# Patient Record
Sex: FEMALE | Race: OTHER | HISPANIC OR LATINO | Employment: UNEMPLOYED | URBAN - METROPOLITAN AREA
[De-identification: names, ages, dates, MRNs, and addresses within clinical notes are randomized per-mention and may not be internally consistent; named-entity substitution may affect disease eponyms.]

---

## 2017-02-21 ENCOUNTER — HISTORICAL (OUTPATIENT)
Dept: RADIOLOGY | Facility: HOSPITAL | Age: 42
End: 2017-02-21

## 2018-09-13 ENCOUNTER — HISTORICAL (OUTPATIENT)
Dept: RADIOLOGY | Facility: HOSPITAL | Age: 43
End: 2018-09-13

## 2018-11-13 ENCOUNTER — HISTORICAL (OUTPATIENT)
Dept: ADMINISTRATIVE | Facility: HOSPITAL | Age: 43
End: 2018-11-13

## 2018-11-13 LAB
ABS NEUT (OLG): 4.1 X10(3)/MCL (ref 2.1–9.2)
ALBUMIN SERPL-MCNC: 4.1 GM/DL (ref 3.4–5)
ALBUMIN/GLOB SERPL: 1.46 {RATIO} (ref 1.5–2.5)
ALP SERPL-CCNC: 94 UNIT/L (ref 38–126)
ALT SERPL-CCNC: 20 UNIT/L (ref 7–52)
APPEARANCE, UA: CLEAR
AST SERPL-CCNC: 16 UNIT/L (ref 15–37)
BACTERIA #/AREA URNS AUTO: ABNORMAL /HPF
BILIRUB SERPL-MCNC: 1.2 MG/DL (ref 0.2–1)
BILIRUB UR QL STRIP: NEGATIVE MG/DL
BILIRUBIN DIRECT+TOT PNL SERPL-MCNC: 0.2 MG/DL (ref 0–0.5)
BILIRUBIN DIRECT+TOT PNL SERPL-MCNC: 1 MG/DL
BUN SERPL-MCNC: 9 MG/DL (ref 7–18)
CALCIUM SERPL-MCNC: 8.9 MG/DL (ref 8.5–10)
CHLORIDE SERPL-SCNC: 109 MMOL/L (ref 98–107)
CHOLEST SERPL-MCNC: 154 MG/DL (ref 0–200)
CHOLEST/HDLC SERPL: 3.2 {RATIO}
CO2 SERPL-SCNC: 24 MMOL/L (ref 21–32)
COLOR UR: YELLOW
CREAT SERPL-MCNC: 0.74 MG/DL (ref 0.6–1.3)
ERYTHROCYTE [DISTWIDTH] IN BLOOD BY AUTOMATED COUNT: 12.6 % (ref 11.5–17)
EST. AVERAGE GLUCOSE BLD GHB EST-MCNC: 103 MG/DL
GLOBULIN SER-MCNC: 2.8 GM/DL (ref 1.2–3)
GLUCOSE (UA): NEGATIVE MG/DL
GLUCOSE SERPL-MCNC: 113 MG/DL (ref 74–106)
HBA1C MFR BLD: 5.2 % (ref 4.4–6.4)
HCT VFR BLD AUTO: 45.3 % (ref 37–47)
HDLC SERPL-MCNC: 48 MG/DL (ref 35–60)
HGB BLD-MCNC: 14.3 GM/DL (ref 12–16)
HGB UR QL STRIP: ABNORMAL UNIT/L
KETONES UR QL STRIP: NEGATIVE MG/DL
LDLC SERPL CALC-MCNC: 94 MG/DL (ref 0–129)
LEUKOCYTE ESTERASE UR QL STRIP: ABNORMAL UNIT/L
LYMPHOCYTES # BLD AUTO: 1.6 X10(3)/MCL (ref 0.6–3.4)
LYMPHOCYTES NFR BLD AUTO: 25.1 % (ref 13–40)
MCH RBC QN AUTO: 29.5 PG (ref 27–31.2)
MCHC RBC AUTO-ENTMCNC: 32 GM/DL (ref 32–36)
MCV RBC AUTO: 94 FL (ref 80–94)
MONOCYTES # BLD AUTO: 0.5 X10(3)/MCL (ref 0.1–1.3)
MONOCYTES NFR BLD AUTO: 8.4 % (ref 0.1–24)
NEUTROPHILS NFR BLD AUTO: 66.5 % (ref 47–80)
NITRITE UR QL STRIP.AUTO: NEGATIVE
PH UR STRIP: 5 [PH]
PLATELET # BLD AUTO: 255 X10(3)/MCL (ref 130–400)
PMV BLD AUTO: 9.9 FL (ref 9.4–12.4)
POTASSIUM SERPL-SCNC: 4 MMOL/L (ref 3.5–5.1)
PROT SERPL-MCNC: 6.9 GM/DL (ref 6.4–8.2)
PROT UR QL STRIP: NEGATIVE MG/DL
RBC # BLD AUTO: 4.84 X10(6)/MCL (ref 4.2–5.4)
RBC #/AREA URNS HPF: ABNORMAL /HPF
SODIUM SERPL-SCNC: 137 MMOL/L (ref 136–145)
SP GR UR STRIP: 1.01
SQUAMOUS EPITHELIAL, UA: ABNORMAL /LPF
TRIGL SERPL-MCNC: 88 MG/DL (ref 30–150)
TSH SERPL-ACNC: 3.74 MIU/ML (ref 0.35–4.94)
UROBILINOGEN UR STRIP-ACNC: 0.2 MG/DL
VLDLC SERPL CALC-MCNC: 17.6 MG/DL
WBC # SPEC AUTO: 6.2 X10(3)/MCL (ref 4.5–11.5)
WBC #/AREA URNS AUTO: ABNORMAL /[HPF]

## 2018-11-29 ENCOUNTER — HISTORICAL (OUTPATIENT)
Dept: ADMINISTRATIVE | Facility: HOSPITAL | Age: 43
End: 2018-11-29

## 2018-11-29 LAB
APPEARANCE, UA: CLEAR
BACTERIA #/AREA URNS AUTO: NORMAL /HPF
BILIRUB UR QL STRIP: NEGATIVE MG/DL
COLOR UR: NORMAL
GLUCOSE (UA): NEGATIVE MG/DL
HGB UR QL STRIP: NEGATIVE UNIT/L
KETONES UR QL STRIP: NEGATIVE MG/DL
LEUKOCYTE ESTERASE UR QL STRIP: NEGATIVE UNIT/L
NITRITE UR QL STRIP.AUTO: NEGATIVE
PH UR STRIP: 6 [PH]
PROT UR QL STRIP: NEGATIVE MG/DL
RBC #/AREA URNS HPF: NORMAL /HPF
SP GR UR STRIP: <1.005
SQUAMOUS EPITHELIAL, UA: NORMAL /LPF
UROBILINOGEN UR STRIP-ACNC: 0.2 MG/DL
WBC #/AREA URNS AUTO: NORMAL /[HPF]

## 2019-11-18 ENCOUNTER — HISTORICAL (OUTPATIENT)
Dept: ADMINISTRATIVE | Facility: HOSPITAL | Age: 44
End: 2019-11-18

## 2019-11-18 LAB
ABS NEUT (OLG): 4.6 X10(3)/MCL (ref 2.1–9.2)
ALBUMIN SERPL-MCNC: 4.2 GM/DL (ref 3.4–5)
ALBUMIN/GLOB SERPL: 1.35 {RATIO} (ref 1.5–2.5)
ALP SERPL-CCNC: 113 UNIT/L (ref 38–126)
ALT SERPL-CCNC: 22 UNIT/L (ref 7–52)
APPEARANCE, UA: CLEAR
AST SERPL-CCNC: 15 UNIT/L (ref 15–37)
BACTERIA #/AREA URNS AUTO: NORMAL /HPF
BILIRUB SERPL-MCNC: 1.1 MG/DL (ref 0.2–1)
BILIRUB UR QL STRIP: NEGATIVE MG/DL
BILIRUBIN DIRECT+TOT PNL SERPL-MCNC: 0.2 MG/DL (ref 0–0.5)
BILIRUBIN DIRECT+TOT PNL SERPL-MCNC: 0.9 MG/DL
BUN SERPL-MCNC: 11 MG/DL (ref 7–18)
CALCIUM SERPL-MCNC: 9.2 MG/DL (ref 8.5–10)
CHLORIDE SERPL-SCNC: 108 MMOL/L (ref 98–107)
CHOLEST SERPL-MCNC: 155 MG/DL (ref 0–200)
CHOLEST/HDLC SERPL: 3.4 {RATIO}
CO2 SERPL-SCNC: 25 MMOL/L (ref 21–32)
COLOR UR: YELLOW
CREAT SERPL-MCNC: 0.82 MG/DL (ref 0.6–1.3)
ERYTHROCYTE [DISTWIDTH] IN BLOOD BY AUTOMATED COUNT: 12.5 % (ref 11.5–17)
EST. AVERAGE GLUCOSE BLD GHB EST-MCNC: 100 MG/DL
GLOBULIN SER-MCNC: 3.1 GM/DL (ref 1.2–3)
GLUCOSE (UA): NEGATIVE MG/DL
GLUCOSE SERPL-MCNC: 115 MG/DL (ref 74–106)
HBA1C MFR BLD: 5.1 % (ref 4.4–6.4)
HCT VFR BLD AUTO: 42.3 % (ref 37–47)
HDLC SERPL-MCNC: 45 MG/DL (ref 35–60)
HGB BLD-MCNC: 15.5 GM/DL (ref 12–16)
HGB UR QL STRIP: NEGATIVE UNIT/L
KETONES UR QL STRIP: NEGATIVE MG/DL
LDLC SERPL CALC-MCNC: 100 MG/DL (ref 0–129)
LEUKOCYTE ESTERASE UR QL STRIP: NORMAL UNIT/L
LYMPHOCYTES # BLD AUTO: 2.1 X10(3)/MCL (ref 0.6–3.4)
LYMPHOCYTES NFR BLD AUTO: 29.7 % (ref 13–40)
MCH RBC QN AUTO: 31.8 PG (ref 27–31.2)
MCHC RBC AUTO-ENTMCNC: 37 GM/DL (ref 32–36)
MCV RBC AUTO: 87 FL (ref 80–94)
MONOCYTES # BLD AUTO: 0.5 X10(3)/MCL (ref 0.1–1.3)
MONOCYTES NFR BLD AUTO: 6.7 % (ref 0.1–24)
NEUTROPHILS NFR BLD AUTO: 63.6 % (ref 47–80)
NITRITE UR QL STRIP.AUTO: NEGATIVE
PH UR STRIP: 5.5 [PH]
PLATELET # BLD AUTO: 295 X10(3)/MCL (ref 130–400)
PMV BLD AUTO: 9.8 FL (ref 9.4–12.4)
POTASSIUM SERPL-SCNC: 4.2 MMOL/L (ref 3.5–5.1)
PROT SERPL-MCNC: 7.3 GM/DL (ref 6.4–8.2)
PROT UR QL STRIP: NEGATIVE MG/DL
RBC # BLD AUTO: 4.87 X10(6)/MCL (ref 4.2–5.4)
RBC #/AREA URNS HPF: NORMAL /HPF
SODIUM SERPL-SCNC: 142 MMOL/L (ref 136–145)
SP GR UR STRIP: 1.01
SQUAMOUS EPITHELIAL, UA: NORMAL /LPF
TRIGL SERPL-MCNC: 93 MG/DL (ref 30–150)
TSH SERPL-ACNC: 2.79 MIU/ML (ref 0.35–4.94)
UROBILINOGEN UR STRIP-ACNC: 0.2 MG/DL
VLDLC SERPL CALC-MCNC: 18.6 MG/DL
WBC # SPEC AUTO: 7.2 X10(3)/MCL (ref 4.5–11.5)
WBC #/AREA URNS AUTO: NORMAL /[HPF]

## 2020-11-20 ENCOUNTER — HISTORICAL (OUTPATIENT)
Dept: ADMINISTRATIVE | Facility: HOSPITAL | Age: 45
End: 2020-11-20

## 2020-11-20 LAB
ABS NEUT (OLG): 3.7 X10(3)/MCL (ref 2.1–9.2)
ALBUMIN SERPL-MCNC: 4 GM/DL (ref 3.4–5)
ALBUMIN/GLOB SERPL: 1.38 {RATIO} (ref 1.5–2.5)
ALP SERPL-CCNC: 109 UNIT/L (ref 38–126)
ALT SERPL-CCNC: 18 UNIT/L (ref 7–52)
APPEARANCE, UA: ABNORMAL
AST SERPL-CCNC: 14 UNIT/L (ref 15–37)
BACTERIA #/AREA URNS AUTO: ABNORMAL /HPF
BILIRUB SERPL-MCNC: 1.2 MG/DL (ref 0.2–1)
BILIRUB UR QL STRIP: NEGATIVE MG/DL
BILIRUBIN DIRECT+TOT PNL SERPL-MCNC: 0.2 MG/DL (ref 0–0.5)
BILIRUBIN DIRECT+TOT PNL SERPL-MCNC: 1 MG/DL
BUN SERPL-MCNC: 12 MG/DL (ref 7–18)
CALCIUM SERPL-MCNC: 9.2 MG/DL (ref 8.5–10)
CHLORIDE SERPL-SCNC: 108 MMOL/L (ref 98–107)
CHOLEST SERPL-MCNC: 162 MG/DL (ref 0–200)
CHOLEST/HDLC SERPL: 3.6 {RATIO}
CO2 SERPL-SCNC: 26 MMOL/L (ref 21–32)
COLOR UR: YELLOW
CREAT SERPL-MCNC: 0.73 MG/DL (ref 0.6–1.3)
ERYTHROCYTE [DISTWIDTH] IN BLOOD BY AUTOMATED COUNT: 12.7 % (ref 11.5–17)
EST. AVERAGE GLUCOSE BLD GHB EST-MCNC: 105 MG/DL
GLOBULIN SER-MCNC: 2.9 GM/DL (ref 1.2–3)
GLUCOSE (UA): NEGATIVE MG/DL
GLUCOSE SERPL-MCNC: 103 MG/DL (ref 74–106)
HBA1C MFR BLD: 5.3 % (ref 4.4–6.4)
HCT VFR BLD AUTO: 42.2 % (ref 37–47)
HDLC SERPL-MCNC: 45 MG/DL (ref 35–60)
HGB BLD-MCNC: 13.8 GM/DL (ref 12–16)
HGB UR QL STRIP: ABNORMAL UNIT/L
KETONES UR QL STRIP: NEGATIVE MG/DL
LDLC SERPL CALC-MCNC: 113 MG/DL (ref 0–129)
LEUKOCYTE ESTERASE UR QL STRIP: NEGATIVE UNIT/L
LYMPHOCYTES # BLD AUTO: 1.7 X10(3)/MCL (ref 0.6–3.4)
LYMPHOCYTES NFR BLD AUTO: 28.2 % (ref 13–40)
MCH RBC QN AUTO: 28.8 PG (ref 27–31.2)
MCHC RBC AUTO-ENTMCNC: 33 GM/DL (ref 32–36)
MCV RBC AUTO: 88 FL (ref 80–94)
MONOCYTES # BLD AUTO: 0.6 X10(3)/MCL (ref 0.1–1.3)
MONOCYTES NFR BLD AUTO: 10.1 % (ref 0.1–24)
NEUTROPHILS NFR BLD AUTO: 61.7 % (ref 47–80)
NITRITE UR QL STRIP.AUTO: NEGATIVE
PH UR STRIP: 5.5 [PH]
PLATELET # BLD AUTO: 279 X10(3)/MCL (ref 130–400)
PMV BLD AUTO: 9.2 FL (ref 9.4–12.4)
POTASSIUM SERPL-SCNC: 4.2 MMOL/L (ref 3.5–5.1)
PROT SERPL-MCNC: 6.9 GM/DL (ref 6.4–8.2)
PROT UR QL STRIP: NEGATIVE MG/DL
RBC # BLD AUTO: 4.79 X10(6)/MCL (ref 4.2–5.4)
RBC #/AREA URNS HPF: ABNORMAL /HPF
SODIUM SERPL-SCNC: 139 MMOL/L (ref 136–145)
SP GR UR STRIP: 1.01
SQUAMOUS EPITHELIAL, UA: ABNORMAL /LPF
TRIGL SERPL-MCNC: 102 MG/DL (ref 30–150)
TSH SERPL-ACNC: 3.19 MIU/ML (ref 0.35–4.94)
UROBILINOGEN UR STRIP-ACNC: 0.2 MG/DL
VLDLC SERPL CALC-MCNC: 20.4 MG/DL
WBC # SPEC AUTO: 6 X10(3)/MCL (ref 4.5–11.5)
WBC #/AREA URNS AUTO: ABNORMAL /[HPF]

## 2020-12-18 ENCOUNTER — HISTORICAL (OUTPATIENT)
Dept: RADIOLOGY | Facility: HOSPITAL | Age: 45
End: 2020-12-18

## 2021-01-11 ENCOUNTER — HISTORICAL (OUTPATIENT)
Dept: RADIOLOGY | Facility: HOSPITAL | Age: 46
End: 2021-01-11

## 2021-11-23 ENCOUNTER — HISTORICAL (OUTPATIENT)
Dept: ADMINISTRATIVE | Facility: HOSPITAL | Age: 46
End: 2021-11-23

## 2021-11-23 LAB
ABS NEUT (OLG): 2.8 X10(3)/MCL (ref 2.1–9.2)
ALBUMIN SERPL-MCNC: 4.1 GM/DL (ref 3.4–5)
ALBUMIN/GLOB SERPL: 1.58 {RATIO} (ref 1.5–2.5)
ALP SERPL-CCNC: 111 UNIT/L (ref 38–126)
ALT SERPL-CCNC: 16 UNIT/L (ref 7–52)
APPEARANCE, UA: CLEAR
AST SERPL-CCNC: 14 UNIT/L (ref 15–37)
BACTERIA #/AREA URNS AUTO: ABNORMAL /HPF
BILIRUB SERPL-MCNC: 1 MG/DL (ref 0.2–1)
BILIRUB UR QL STRIP: NEGATIVE MG/DL
BILIRUBIN DIRECT+TOT PNL SERPL-MCNC: 0.2 MG/DL (ref 0–0.5)
BILIRUBIN DIRECT+TOT PNL SERPL-MCNC: 0.8 MG/DL
BUN SERPL-MCNC: 9 MG/DL (ref 7–18)
CALCIUM SERPL-MCNC: 9.1 MG/DL (ref 8.5–10)
CHLORIDE SERPL-SCNC: 107 MMOL/L (ref 98–107)
CHOLEST SERPL-MCNC: 160 MG/DL (ref 0–200)
CHOLEST/HDLC SERPL: 3.6 {RATIO}
CO2 SERPL-SCNC: 27 MMOL/L (ref 21–32)
COLOR UR: ABNORMAL
CREAT SERPL-MCNC: 0.7 MG/DL (ref 0.6–1.3)
ERYTHROCYTE [DISTWIDTH] IN BLOOD BY AUTOMATED COUNT: 13.1 % (ref 11.5–17)
EST. AVERAGE GLUCOSE BLD GHB EST-MCNC: 103 MG/DL
GLOBULIN SER-MCNC: 2.6 GM/DL (ref 1.2–3)
GLUCOSE (UA): NEGATIVE MG/DL
GLUCOSE SERPL-MCNC: 104 MG/DL (ref 74–106)
HBA1C MFR BLD: 5.2 % (ref 4.4–6.4)
HCT VFR BLD AUTO: 42 % (ref 37–47)
HDLC SERPL-MCNC: 45 MG/DL (ref 35–60)
HGB BLD-MCNC: 13.3 GM/DL (ref 12–16)
HGB UR QL STRIP: ABNORMAL UNIT/L
KETONES UR QL STRIP: NEGATIVE MG/DL
LDLC SERPL CALC-MCNC: 96 MG/DL (ref 0–129)
LEUKOCYTE ESTERASE UR QL STRIP: NEGATIVE UNIT/L
LYMPHOCYTES # BLD AUTO: 1.1 X10(3)/MCL (ref 0.6–3.4)
LYMPHOCYTES NFR BLD AUTO: 24.4 % (ref 13–40)
MCH RBC QN AUTO: 28.1 PG (ref 27–31.2)
MCHC RBC AUTO-ENTMCNC: 32 GM/DL (ref 32–36)
MCV RBC AUTO: 89 FL (ref 80–94)
MONOCYTES # BLD AUTO: 0.5 X10(3)/MCL (ref 0.1–1.3)
MONOCYTES NFR BLD AUTO: 11.5 % (ref 0.1–24)
NEUTROPHILS NFR BLD AUTO: 64.1 % (ref 47–80)
NITRITE UR QL STRIP.AUTO: NEGATIVE
PH UR STRIP: 6.5 [PH]
PLATELET # BLD AUTO: 294 X10(3)/MCL (ref 130–400)
PMV BLD AUTO: 9.4 FL (ref 9.4–12.4)
POTASSIUM SERPL-SCNC: 4 MMOL/L (ref 3.5–5.1)
PROT SERPL-MCNC: 6.7 GM/DL (ref 6.4–8.2)
PROT UR QL STRIP: ABNORMAL MG/DL
RBC # BLD AUTO: 4.73 X10(6)/MCL (ref 4.2–5.4)
RBC #/AREA URNS HPF: ABNORMAL /HPF
SODIUM SERPL-SCNC: 142 MMOL/L (ref 136–145)
SP GR UR STRIP: 1.01
SQUAMOUS EPITHELIAL, UA: ABNORMAL /LPF
TRIGL SERPL-MCNC: 107 MG/DL (ref 30–150)
TSH SERPL-ACNC: 2.18 MIU/ML (ref 0.35–4.94)
UROBILINOGEN UR STRIP-ACNC: 0.2 MG/DL
VLDLC SERPL CALC-MCNC: 21.4 MG/DL
WBC # SPEC AUTO: 4.4 X10(3)/MCL (ref 4.5–11.5)
WBC #/AREA URNS AUTO: ABNORMAL /[HPF]

## 2021-12-14 ENCOUNTER — HISTORICAL (OUTPATIENT)
Dept: ENDOSCOPY | Facility: HOSPITAL | Age: 46
End: 2021-12-14

## 2022-04-10 ENCOUNTER — HISTORICAL (OUTPATIENT)
Dept: ADMINISTRATIVE | Facility: HOSPITAL | Age: 47
End: 2022-04-10

## 2022-04-24 VITALS
WEIGHT: 288.56 LBS | SYSTOLIC BLOOD PRESSURE: 128 MMHG | OXYGEN SATURATION: 99 % | BODY MASS INDEX: 56.65 KG/M2 | HEIGHT: 60 IN | DIASTOLIC BLOOD PRESSURE: 74 MMHG

## 2022-11-28 PROBLEM — I10 HTN (HYPERTENSION): Status: ACTIVE | Noted: 2022-11-28

## 2022-11-28 PROBLEM — R51.9 HEAD ACHE: Status: ACTIVE | Noted: 2022-11-28

## 2022-11-28 PROBLEM — Z00.00 WELLNESS EXAMINATION: Status: ACTIVE | Noted: 2022-11-28

## 2022-11-29 PROBLEM — R73.9 HYPERGLYCEMIA: Status: ACTIVE | Noted: 2022-11-29

## 2023-02-23 ENCOUNTER — CLINICAL SUPPORT (OUTPATIENT)
Dept: BARIATRICS | Facility: HOSPITAL | Age: 48
End: 2023-02-23

## 2023-02-23 VITALS — HEIGHT: 61 IN | WEIGHT: 293 LBS | BODY MASS INDEX: 55.32 KG/M2

## 2023-02-23 NOTE — PROGRESS NOTES
BEHAVIOR MODIFICATION AND EXERCISE CONSULT    Non Surgical: [] Surgical: [x]    PERSONAL:     What initiated your interest in bariatric surgery?   Tried dieting and weight just keeps going up. Started gaining in early 20's.    Marital Status: []Single   [x]   []   []      Do you have children? 4 (31, 29, 21, 15)    What is your highest level of education completed? Some college    Who is your social or relational support?     Do you work? []Yes   [x]No   []Disabled   []Retired    If yes, what is your occupation?      Do you enjoy your work?     PHYSICAL ACTIVITY:    Do you currently exercise: []Yes   [x]No    If so, please describe:      Have you experienced any injuries and/or restrictions that may limit your physical activity? [x]Yes   []No      If so, please describe: steel dmitry in left leg 1994, right knee.    How do you feel about exercise? Enjoy walking      BEHAVIORS:    What behavior(s) would you like to change in order to be healthy? Healthier eating, exercise.    On a scale of 1-10 (1-extremely low, 10-extremely high), how motivated are you to change your behavior(s)? 10    Do you currently use any type of tobacco products (vape, dip, cigarettes, etc.) No    If yes, on average, how many and/or how often do you use these products on a daily basis?    How many hours of sleep do you average? 5 (has had trouble the last 5 years)    Rate your stress level on a scale of 1-10 (1-extremely low, 10-extremely high) 5    What is your biggest stressor? Can't think of anything.    Is your appetite affected by stress? Yes, boredom, sad, happy    How do you cope and/or manage stress? Not really    NOTES: Highest weight is 315 lbs. Lowest adult weight 195 lbs. Goal weight 150 lbs. A body composition was conducted and patient was educated on results. Waist/hip ratio is 56/61 inches. Handout: emotional connections to food.    Sharon Christianson, GERMAINE, CPT, CHC

## 2023-02-23 NOTE — PROGRESS NOTES
NUTRITIONAL CONSULT    Initial assessment for laparoscopic Cheng-en-Y   Non Surgical: []      PAST HISTORY:   Dieting attempts include Pt started gaining weight after children, she will do diets and she doesn't see change so she stops   Highest weight: 316#    CLINICAL DATA:  Height: 61in  Weight: 316 lbs  IBW: 110 lbs  BMI: 57.80  Bariatric goal weight (125% EBW): 132 lbs  Patient's goal weight: 160 lbs    FAMILY HISTORY OF OBESITY:   [x]Yes    []No            WHAT SIDE OF THE FAMILY?   []Mother   []Father   [x]Sibling   []Child     []Extended    []Adopted       Goal for Bariatric Surgery: to improve health, to improve quality of life, to lose weight, and to prevent future medical conditions    NUTRITION & HEALTH HISTORY:  Greatest challenge: sweets, starchy CHO, and portion control    Current diet recall:   Coffee with breakfast bread   330pm: Pork chops rice and vegetables   Snacking: sweets   Current Dietary Patterns:  Meal pattern: 2  Snackin / day Type:   Vegetables: Likes a variety. Eats almost daily.  Fruits: Likes a variety. Eats almost daily.  Beverages: water and coffee without sugar  Alcohol consumption: Never. Type:   Dining out: Monthly. Mostly fast food and take-out.  Grocery shopping and food prep: [x]Self   []Spouse   []Other:   Emotional eating: []Yes   [x]No Which emotions if yes:  boredom   Nighttime snacking: [x]Yes   []No Middle of night: []Yes   []No  Sweets Before bedtime: []Yes   []No  Hx of disordered eating behaviors: []Yes   [x]No Anorexia: []Yes   []No Bulimia: []Yes   []No  Purging: []Yes   []No Binging: []Yes   []No  History of vitamin/mineral deficiencies:   []Yes   [x]No    Vitamins / Minerals / Herbs:   None     Food Allergies:   None       ASSESSMENT:  Patient reports attempts at weight loss, only to regain lost weight.  Patient demonstrated knowledge of healthy eating behaviors and exercise patterns; admits to not eating healthy and not exercising at this point.  Patient states  willingness to change lifestyle and make behavior modifications.  Expect good  compliance after surgery at this time.        ESTIMATED NEEDS:  Calories: 0070-0539  (20-25 kcal/kg adjusted BW/d)  Protein:  60-66  (1.0-1.1 g/kg adjusted BW/d)  Fluid:   1200  (20 mL/kg actual BW/d)    BARIATRIC DIET DISCUSSION:  Discussed diet after surgery and related to patients food record.  Reviewed diet progression before and after surgery.  Reinforced that surgery is not a magic bullet and importance of low fat foods and no snacking.  Answered all questions.    RECOMMENDATIONS:  Patient is a good candidate for bariatric surgery.      PLAN:  Eat 3 meals per day with protein   Limit starches, start to measure out

## 2023-03-06 PROBLEM — Z00.00 WELLNESS EXAMINATION: Status: RESOLVED | Noted: 2022-11-28 | Resolved: 2023-03-06

## 2023-03-08 ENCOUNTER — CLINICAL SUPPORT (OUTPATIENT)
Dept: BARIATRICS | Facility: HOSPITAL | Age: 48
End: 2023-03-08

## 2023-03-08 VITALS — BODY MASS INDEX: 55.32 KG/M2 | WEIGHT: 293 LBS | HEIGHT: 61 IN

## 2023-03-08 PROCEDURE — 94200034 HC BARIATRIC NUTRITIONAL SVCS PT GRADE I: Performed by: DIETITIAN, REGISTERED

## 2023-03-08 NOTE — PROGRESS NOTES
Patient Education [] MSWL Visit Number:      [x]  Pre-op Wt Loss Goal (as ordered on referral): 50kg/m2  [] NSWL Visit:     Current Wt: 316#  Current BMI: 59.7kg/m2                                                                      Barriers to learning:  [x]None evident  []Acuity of illness  []Cognitive defects  []Cultural barriers  []Desire/Motivation  []Difficulty concentrating  []Emotional state  []Financial concerns  []Hearing deficit  []Language barrier  []Literacy  []Memory problems  []Vision impairment     Home caregiver present for session   []YES  [x] NO       Teaching methods:  []Demonstration  [x]Explanation  [x]Printed materials  []Teach back  []Virtual/web based         Verbalizes understanding Demonstrates  Needs further teaching Needs practice/ supervision Comments    Bariatric Surgery Diet  [] [] [] []    Clear liquid [] [] [] []    Full liquid [] [] [] []    Weight Reduction [x] [] [] [] BMI 50   Other Diet [] [] [] []          Additional Learner (s) Present                                                                  []Spouse   []Daughter    []  Son  []Family member   []Friend   []Grandfather   []Grandmother   []Father   []Mother   []Other       Expected Compliance:  [x]Good  []Fair  []Poor    Additional Information:    Pt educated on modified meal plan. Advised to reduce starchy CHOs to 1/2 c or quarter of the plate per meal. Advised pt on healthier snack options in order to reduce sugar intake. Pt given a dining out guide, sample menu and grocery guide.      24 hr recall:  B 6:30PM- bagel with cream cheese, egg; coffee  Supp 3/4pm- rice, gravy, pork chops/BBQ chicken; tacos, enchiladas  Sn- ice cream, candy, cake  Bevs- water, coke    Plan:  Return to clinic 4w for wt loss assessment.

## 2023-04-05 ENCOUNTER — CLINICAL SUPPORT (OUTPATIENT)
Dept: BARIATRICS | Facility: HOSPITAL | Age: 48
End: 2023-04-05

## 2023-04-05 VITALS — HEIGHT: 61 IN | WEIGHT: 293 LBS | BODY MASS INDEX: 55.32 KG/M2

## 2023-04-05 PROCEDURE — 94200034 HC BARIATRIC NUTRITIONAL SVCS PT GRADE I: Performed by: DIETITIAN, REGISTERED

## 2023-04-05 NOTE — PROGRESS NOTES
Patient Education [] MSWL Visit Number:      [x]  Pre-op Wt Loss Goal (as ordered on referral): BMI 55 kg/m2  [] NS Visit:     Current Wt: 304#  Current BMI:57.4 kg/m2                                                                      Barriers to learning:  [x]None evident  []Acuity of illness  []Cognitive defects  []Cultural barriers  []Desire/Motivation  []Difficulty concentrating  []Emotional state  []Financial concerns  []Hearing deficit  []Language barrier  []Literacy  []Memory problems  []Vision impairment     Home caregiver present for session   []YES  [x] NO       Teaching methods:  []Demonstration  [x]Explanation  []Printed materials  []Teach back  []Virtual/web based         Verbalizes understanding Demonstrates  Needs further teaching Needs practice/ supervision Comments    Bariatric Surgery Diet  [] [] [] []    Clear liquid [] [] [] []    Full liquid [] [] [] []    Weight Reduction [x] [] [] []    Other Diet [] [] [] []          Additional Learner (s) Present                                                                  []Spouse   []Daughter    []  Son  []Family member   []Friend   []Grandfather   []Grandmother   []Father   []Mother   []Other       Expected Compliance:  [x]Good  []Fair  []Poor    Additional Information:    Pt with 12# wt loss. Pt has reduced sugar and starchy CHO intake and increased protein as a result. Pt with walking 10 mins/d, 5d/wk. Pt starting Renetta next week as well.      24 hr recall:  B- coffee with SF creamer, 1 slice toast, Bulgarian pierre/sausage chelsea  Sn- Orgain protein shake, made with unsweetened almond milk  L- salad with 4oz chicken  Sn- another protein   Supp- dinner with family  Bevs- water    Plan:  Continue with current meal and exercise plan.

## 2023-05-16 ENCOUNTER — OFFICE VISIT (OUTPATIENT)
Dept: GYNECOLOGY | Facility: CLINIC | Age: 48
End: 2023-05-16
Payer: COMMERCIAL

## 2023-05-16 ENCOUNTER — CLINICAL SUPPORT (OUTPATIENT)
Dept: BARIATRICS | Facility: HOSPITAL | Age: 48
End: 2023-05-16

## 2023-05-16 VITALS
DIASTOLIC BLOOD PRESSURE: 83 MMHG | BODY MASS INDEX: 55.32 KG/M2 | RESPIRATION RATE: 20 BRPM | HEIGHT: 61 IN | TEMPERATURE: 98 F | OXYGEN SATURATION: 100 % | SYSTOLIC BLOOD PRESSURE: 137 MMHG | HEART RATE: 73 BPM | WEIGHT: 293 LBS

## 2023-05-16 VITALS — WEIGHT: 293 LBS | BODY MASS INDEX: 55.32 KG/M2 | HEIGHT: 61 IN

## 2023-05-16 DIAGNOSIS — Z12.31 VISIT FOR SCREENING MAMMOGRAM: ICD-10-CM

## 2023-05-16 DIAGNOSIS — Z12.4 PAP SMEAR FOR CERVICAL CANCER SCREENING: Primary | ICD-10-CM

## 2023-05-16 DIAGNOSIS — E66.9 OBESITY, UNSPECIFIED CLASSIFICATION, UNSPECIFIED OBESITY TYPE, UNSPECIFIED WHETHER SERIOUS COMORBIDITY PRESENT: ICD-10-CM

## 2023-05-16 DIAGNOSIS — E66.9 OBESITY, UNSPECIFIED CLASSIFICATION, UNSPECIFIED OBESITY TYPE, UNSPECIFIED WHETHER SERIOUS COMORBIDITY PRESENT: Primary | ICD-10-CM

## 2023-05-16 PROCEDURE — 4010F ACE/ARB THERAPY RXD/TAKEN: CPT | Mod: CPTII,,, | Performed by: NURSE PRACTITIONER

## 2023-05-16 PROCEDURE — 88174 CYTOPATH C/V AUTO IN FLUID: CPT | Performed by: NURSE PRACTITIONER

## 2023-05-16 PROCEDURE — 99214 OFFICE O/P EST MOD 30 MIN: CPT | Mod: PBBFAC | Performed by: NURSE PRACTITIONER

## 2023-05-16 PROCEDURE — 1159F PR MEDICATION LIST DOCUMENTED IN MEDICAL RECORD: ICD-10-PCS | Mod: CPTII,,, | Performed by: NURSE PRACTITIONER

## 2023-05-16 PROCEDURE — 99396 PREV VISIT EST AGE 40-64: CPT | Mod: S$PBB,,, | Performed by: NURSE PRACTITIONER

## 2023-05-16 PROCEDURE — 3079F DIAST BP 80-89 MM HG: CPT | Mod: CPTII,,, | Performed by: NURSE PRACTITIONER

## 2023-05-16 PROCEDURE — 3075F SYST BP GE 130 - 139MM HG: CPT | Mod: CPTII,,, | Performed by: NURSE PRACTITIONER

## 2023-05-16 PROCEDURE — 94200034 HC BARIATRIC NUTRITIONAL SVCS PT GRADE I: Performed by: DIETITIAN, REGISTERED

## 2023-05-16 PROCEDURE — 3008F PR BODY MASS INDEX (BMI) DOCUMENTED: ICD-10-PCS | Mod: CPTII,,, | Performed by: NURSE PRACTITIONER

## 2023-05-16 PROCEDURE — 3075F PR MOST RECENT SYSTOLIC BLOOD PRESS GE 130-139MM HG: ICD-10-PCS | Mod: CPTII,,, | Performed by: NURSE PRACTITIONER

## 2023-05-16 PROCEDURE — 3008F BODY MASS INDEX DOCD: CPT | Mod: CPTII,,, | Performed by: NURSE PRACTITIONER

## 2023-05-16 PROCEDURE — 4010F PR ACE/ARB THEARPY RXD/TAKEN: ICD-10-PCS | Mod: CPTII,,, | Performed by: NURSE PRACTITIONER

## 2023-05-16 PROCEDURE — 87624 HPV HI-RISK TYP POOLED RSLT: CPT

## 2023-05-16 PROCEDURE — 3079F PR MOST RECENT DIASTOLIC BLOOD PRESSURE 80-89 MM HG: ICD-10-PCS | Mod: CPTII,,, | Performed by: NURSE PRACTITIONER

## 2023-05-16 PROCEDURE — 1159F MED LIST DOCD IN RCRD: CPT | Mod: CPTII,,, | Performed by: NURSE PRACTITIONER

## 2023-05-16 PROCEDURE — 99396 PR PREVENTIVE VISIT,EST,40-64: ICD-10-PCS | Mod: S$PBB,,, | Performed by: NURSE PRACTITIONER

## 2023-05-16 NOTE — PROGRESS NOTES
Patient Education []      [x]  Pre-op Wt Loss: Goal (as ordered on referral): 55  [] NSWL Visit     Current Wt:   Wt Readings from Last 3 Encounters:   05/16/23 1334 (!) 136.4 kg (300 lb 12.8 oz)   05/16/23 0944 (!) 137 kg (302 lb)   04/05/23 1316 (!) 137.9 kg (304 lb)      Current BMI:   BMI Readings from Last 1 Encounters:   05/16/23 56.84 kg/m²                                                                         Barriers to learning:  [x]None evident  []Acuity of illness  []Cognitive defects  []Cultural barriers  []Desire/Motivation  []Difficulty concentrating  []Emotional state  []Financial concerns  []Hearing deficit  []Language barrier  []Literacy  []Memory problems  []Vision impairment     Home caregiver present for session   []YES  [x] NO       Teaching methods:  []Demonstration  [x]Explanation  [x]Printed materials  [x]Teach back  []Virtual/web based         Verbalizes understanding Demonstrates  Needs further teaching Needs practice/ supervision Comments    Bariatric Surgery Diet  [] [] [] []    Clear liquid [] [] [] []    Full liquid [] [] [] []    Weight Reduction [x] [] [] []    Other Diet [] [] [] []          Additional Learner (s) Present                                                                  []Spouse   []Daughter    []  Son  []Family member   []Friend   []Grandfather   []Grandmother   []Father   []Mother   []Other       Expected Compliance:  [x]Good  []Fair  []Poor    Additional Information:      Got bored of meal; need new ideas. Renetta was cancelled; bought bike but haven't started yet. Discussed letting granddaughter know she wants to bike while she plays in the yard to have external motivation. Asked about blending fruit in protein shake      24 hr recall:  B papusa  L meat and vegetables  D carne asada with tortillas  1-2 protein shakes    Plan:  Resume work-up for surgery.  Continue to review Bariatric Nutrition Guidebook at home and call with any questions.  Work on gradually  cutting back on starchy CHO in the diet.  5-6 meals per day.  Increase exercise.  Try new recipe ideas; add 1/2 cup fruit to protein shakes

## 2023-05-16 NOTE — PROGRESS NOTES
"    Subjective:       Patient ID: Lynette Luna is a 47 y.o. female.    Chief Complaint:  Gynecologic Exam      History of Present Illness  The patient  here for annual exam. Her LMP was 23. Period last 7 days and changes pads 2-3x/day. Denies history of abnormal paps. Last pap -NIL and HPV neg.  MG- & BIRADS 1. Denies breast or urinary complaints. Denies pelvic pain, abnormal bleeding or discharge. Pt reports no STIs in the past and no concerns. Partner had vasectomy for contraception. Denies tobacco use. Dep. screening 0. Denies fly hx of breast, ovarian, uterine cancer. Colon cancer in [place]. Colonoscopy in .    GYN & OB History  Patient's last menstrual period was 2023 (exact date).   Date of Last Pap: 2019    Review of patient's allergies indicates:  No Known Allergies  Past Medical History:   Diagnosis Date    Blood in stool     Gall bladder disease     Head ache     HTN (hypertension)     Other seasonal allergic rhinitis      OB History    Para Term  AB Living   5 4           SAB IAB Ectopic Multiple Live Births                  # Outcome Date GA Lbr Gab/2nd Weight Sex Delivery Anes PTL Lv   5             4 Para            3 Para            2 Para            1 Para                 Review of Systems  Review of Systems    Negative except for pertinent findings for positives per HPI     Objective:    Physical Exam    /83 (BP Location: Left arm, Patient Position: Sitting, BP Method: Medium (Automatic))   Pulse 73   Temp 98.2 °F (36.8 °C) (Oral)   Resp 20   Ht 5' 1" (1.549 m)   Wt (!) 137 kg (302 lb)   LMP 2023 (Exact Date)   SpO2 100%   BMI 57.06 kg/m²   GENERAL: Well-developed female in no acute distress.  SKIN: Normal to inspection, warm and intact.  BREASTS: No masses, lumps, discharge, tenderness.  VULVA: General appearance normal; external genitalia with no lesions or erythema.  VAGINA: Mucosa normal, pink, no abnormal discharge or " lesions.  CERVIX: Grossly normal, pink, scant blood, no erythema or abnormal discharge.  BIMANUAL EXAM: Limited exam d/t body habitus.   PSYCHIATRIC: Patient is oriented to person, place, and time. Mood and affect are normal.    Assessment:       1. Pap smear for cervical cancer screening    2. Visit for screening mammogram    3. Obesity, unspecified classification, unspecified obesity type, unspecified whether serious comorbidity present         Plan:   Lynette was seen today for gynecologic exam.    Diagnoses and all orders for this visit:    Pap smear for cervical cancer screening  -     Liquid-Based Pap Smear, Screening Screening    Visit for screening mammogram  -     Mammo Digital Screening Bilat w/ Yariel; Future    Obesity, unspecified classification, unspecified obesity type, unspecified whether serious comorbidity present    Pap today  MG ordered  Healthy lifestyle choices. Importance of maintaining a healthy BMI. Healthy diet including limiting fast foods, processed foods, foods containing high amounts of saturated fats or high sodium content. Recommend low carb, low fat diet rich in lean meat and fish, non starchy vegetables, fruits and good fat while maintaining portion control. Limit sugar intake. Recommended plenty of water, avoiding carbonated beverages and high fructose corn syrup. Regular cardiovascular exercise, at least 150 minutes of cardiovascular exercise (at least 30 mins 5x/week).  Follow up in about 1 year (around 5/16/2024) for annual exam.

## 2023-05-18 LAB — PSYCHE PATHOLOGY RESULT: NORMAL

## 2023-05-24 ENCOUNTER — HOSPITAL ENCOUNTER (OUTPATIENT)
Dept: RADIOLOGY | Facility: HOSPITAL | Age: 48
Discharge: HOME | End: 2023-05-24
Attending: NURSE PRACTITIONER
Payer: COMMERCIAL

## 2023-05-24 DIAGNOSIS — Z12.31 VISIT FOR SCREENING MAMMOGRAM: ICD-10-CM

## 2023-05-24 PROCEDURE — 77067 SCR MAMMO BI INCL CAD: CPT | Mod: 26,,, | Performed by: RADIOLOGY

## 2023-05-24 PROCEDURE — 77063 MAMMO DIGITAL SCREENING BILAT WITH TOMO: ICD-10-PCS | Mod: 26,,, | Performed by: RADIOLOGY

## 2023-05-24 PROCEDURE — 77063 BREAST TOMOSYNTHESIS BI: CPT | Mod: 26,,, | Performed by: RADIOLOGY

## 2023-05-24 PROCEDURE — 77067 SCR MAMMO BI INCL CAD: CPT | Mod: TC

## 2023-05-24 PROCEDURE — 77067 MAMMO DIGITAL SCREENING BILAT WITH TOMO: ICD-10-PCS | Mod: 26,,, | Performed by: RADIOLOGY

## 2023-06-13 ENCOUNTER — CLINICAL SUPPORT (OUTPATIENT)
Dept: BARIATRICS | Facility: HOSPITAL | Age: 48
End: 2023-06-13

## 2023-06-13 VITALS — HEIGHT: 61 IN | WEIGHT: 293 LBS | BODY MASS INDEX: 55.32 KG/M2

## 2023-06-13 PROCEDURE — 94200034 HC BARIATRIC NUTRITIONAL SVCS PT GRADE I: Performed by: DIETITIAN, REGISTERED

## 2023-06-13 NOTE — PROGRESS NOTES
Patient Education [x]  Pre-op Wt Loss: Goal (as ordered on referral): 55  [] NSWL Visit     Current Wt:   Wt Readings from Last 3 Encounters:   06/13/23 1409 (!) 137 kg (302 lb 1.6 oz)   05/16/23 1334 (!) 136.4 kg (300 lb 12.8 oz)   05/16/23 0944 (!) 137 kg (302 lb)      Current BMI:   BMI Readings from Last 1 Encounters:   06/13/23 57.08 kg/m²                                                                         Barriers to learning:  [x]None evident  []Acuity of illness  []Cognitive defects  []Cultural barriers  []Desire/Motivation  []Difficulty concentrating  []Emotional state  []Financial concerns  []Hearing deficit  []Language barrier  []Literacy  []Memory problems  []Vision impairment     Home caregiver present for session   []YES  [x] NO       Teaching methods:  []Demonstration  [x]Explanation  [x]Printed materials  []Teach back  []Virtual/web based         Verbalizes understanding Demonstrates  Needs further teaching Needs practice/ supervision Comments    Bariatric Surgery Diet  [] [] [] []    Clear liquid [] [] [] []    Full liquid [] [] [] []    Weight Reduction [x] [] [] []    Other Diet [] [] [] []          Additional Learner (s) Present                                                                  []Spouse   []Daughter    []  Son  []Family member   []Friend   []Grandfather   []Grandmother   []Father   []Mother   []Other       Expected Compliance:  [x]Good  []Fair  []Poor    Additional Information:      Gained 2 lbs since last visit.   Since school finished, play with grandkids. Eating more of grandkid snacks and foods. Dad was diagnosed with heart failure.   Eating mcdonalds/burger harvey few times a week. Meat, rice or pasta, shrimp pasta, tacos. On stationary bike, pushing to go as fast as possible for 5 minutes; discussed moderate intensity means being able to speak; maintaining a moderate pace for a longer period.     24 hr recall:  B: skipping breakfast or english muffin, Zambian pierre,  egg  11 am: protein shake  330-4 pm: Meat, rice or pasta, shrimp pasta, tacos. Or  something    Plan:  Eat within 1 hour of waking up  Limit starchy carbohydrates, including starchy vegetables (corn, dried beans, and peas) to a quarter of the dinner plate or 1/2 cup cooked per meal or up to 15-20 grams total carbohydrates on labeled items. , Increase non-starchy vegetables to 2 cups for lunch and supper or half a plate.   Moderate pace on bike for 20-30 minutes at a time 3-4x week.

## 2023-07-17 ENCOUNTER — CLINICAL SUPPORT (OUTPATIENT)
Dept: BARIATRICS | Facility: HOSPITAL | Age: 48
End: 2023-07-17

## 2023-07-17 VITALS — HEIGHT: 61 IN | BODY MASS INDEX: 55.32 KG/M2 | WEIGHT: 293 LBS

## 2023-07-17 PROCEDURE — 94200034 HC BARIATRIC NUTRITIONAL SVCS PT GRADE I: Performed by: DIETITIAN, REGISTERED

## 2023-07-17 NOTE — PROGRESS NOTES
Patient Education [] MSWL Visit Number:      [x]  Pre-op Wt Loss Goal (as ordered on referral): BMI 55  [] [facility] Visit:     Current Wt: 299#  Current BMI: 56.5 kg/m2                                                                      Barriers to learning:  [x]None evident  []Acuity of illness  []Cognitive defects  []Cultural barriers  []Desire/Motivation  []Difficulty concentrating  []Emotional state  []Financial concerns  []Hearing deficit  []Language barrier  []Literacy  []Memory problems  []Vision impairment     Home caregiver present for session   []YES  [x] NO       Teaching methods:  []Demonstration  [x]Explanation  []Printed materials  []Teach back  []Virtual/web based         Verbalizes understanding Demonstrates  Needs further teaching Needs practice/ supervision Comments    Bariatric Surgery Diet  [] [] [] []    Clear liquid [] [] [] []    Full liquid [] [] [] []    Weight Reduction [x] [] [] []    Other Diet [] [] [] []          Additional Learner (s) Present                                                                  []Spouse   []Daughter    []  Son  []Family member   []Friend   []Grandfather   []Grandmother   []Father   []Mother   []Other       Expected Compliance:  [x]Good  []Fair  []Poor    Additional Information:    Pt with 3# wt loss since last visit. Pt has been walking consistently since last visit.       24 hr recall:  B- English muffin with eggs, Bangladeshi pierre, cream cheese  Noon- Orgain Protein shake with unsweetened almond milk and frozen fruit  3PM- rice, meat  6/6:30PM- Protein shake (same as above)  Bevs- water       Plan:  Continue walking regimen.  Measure starchy CHOs to 1/2 c per meal.  Attend class next week for full pre-op diet education.

## 2023-07-18 PROBLEM — E66.01 MORBID OBESITY WITH BMI OF 50.0-59.9, ADULT: Chronic | Status: ACTIVE | Noted: 2023-07-18

## 2023-07-18 PROBLEM — Z86.19 HISTORY OF HELICOBACTER PYLORI INFECTION: Status: ACTIVE | Noted: 2023-07-18

## 2023-07-19 PROBLEM — Z86.19 HISTORY OF HELICOBACTER PYLORI INFECTION: Status: RESOLVED | Noted: 2023-07-18 | Resolved: 2023-07-19

## 2023-07-25 ENCOUNTER — CLINICAL SUPPORT (OUTPATIENT)
Dept: BARIATRICS | Facility: HOSPITAL | Age: 48
End: 2023-07-25

## 2023-07-25 NOTE — PROGRESS NOTES
Date of education: 7/25/23  Pt education type: [x]Pre op  []Post op  Surgery date: 8/21/23   Type of surgery: sleeve gastrectomy     Education was provided on:   []Importance of protein and vitamin protocol  []Importance of drinking  fl oz/day of non carbonated sugar free non caffeinated beverages  []Importance of following dietary protocol  []Importance of early ambulation postop   []Use of incentive spirometer 10 times an hour while awake  []Non opiod pain management post op   []Discontinuing use of meds containing aspirin, ibuprofen, NSAIDs, post op  []Signs and symptoms of immediate and long term complications post-op  []Prevention and signs and symptoms of blood clots   []Prevention and signs of infection  []Reviewed medication regimen  [x]Importance of adhering to behavioral changes  [x]Importance of following exercise protocol      Barriers to learning:  [x]None evident  []Acuity of illness  []Cognitive defects  []Cultural barriers  []Desire/Motivation  []Difficulty concentrating  []Emotional state  []Financial concerns  []Hearing deficit  []Language barrier  []Literacy  []Memory problems  []Vision impairment     Teaching methods:  [x]Demonstration  [x]Explanation  [x]Printed materials  [x]Teach back  []Virtual/web based    Expected Compliance:  [x]Good  []Fair  []Poor    Additional Notes:  A body composition was conducted and patient was educated on results.    GERMAINE Burris, CPT, CHC

## 2023-07-25 NOTE — PROGRESS NOTES
PreOp Class 7/25/2023     Surgery Date: 8/21/23    Type of Surgery: VSG    Pt was educated on     Importance of early ambulation postop within 4 hours of returning to room   Use of incentive spirometer 10 times an hour while awake  Non opiod pain management post op   Discontinuing use of meds containing aspirin, ibuprofen, NSAIDs, post op  Signs and symptoms of immediate and long term complications post-op  Prevention and signs and symptoms of blood clots   Prevention and signs of infection  Reviewed medication regimen    Pt was interactive, answered questions during in person education session.

## 2023-07-25 NOTE — PROGRESS NOTES
Date of education:   Pt education type: [x]Pre op  []Post op  Surgery date: 8./22/23  Type of surgery: sleeve gastrectomy     Education was provided on:   [x]Importance of protein and vitamin protocol  []Importance of drinking  fl oz/day of non carbonated sugar free non caffeinated beverages  [x]Importance of following dietary protocol  []Importance of early ambulation postop   []Use of incentive spirometer 10 times an hour while awake  []Non opiod pain management post op   []Discontinuing use of meds containing aspirin, ibuprofen, NSAIDs, post op  []Signs and symptoms of immediate and long term complications post-op  []Prevention and signs and symptoms of blood clots   []Prevention and signs of infection  []Reviewed medication regimen  []Importance of adhering to behavioral changes  []Importance of following exercise protocol      Barriers to learning:  [x]None evident  []Acuity of illness  []Cognitive defects  []Cultural barriers  []Desire/Motivation  []Difficulty concentrating  []Emotional state  []Financial concerns  []Hearing deficit  []Language barrier  []Literacy  []Memory problems  []Vision impairment     Teaching methods:  [x]Demonstration  [x]Explanation  [x]Printed materials  []Teach back  []Virtual/web based    Expected Compliance:  [x]Good  []Fair  []Poor    Additional Notes:    Pt states understanding of pre  and post op diet, they will start pre op diet on 8/1/2023

## 2023-08-03 ENCOUNTER — PATIENT MESSAGE (OUTPATIENT)
Dept: ADMINISTRATIVE | Facility: OTHER | Age: 48
End: 2023-08-03
Payer: COMMERCIAL

## 2023-08-08 ENCOUNTER — CLINICAL SUPPORT (OUTPATIENT)
Dept: BARIATRICS | Facility: HOSPITAL | Age: 48
End: 2023-08-08

## 2023-08-08 VITALS — WEIGHT: 291 LBS | BODY MASS INDEX: 54.98 KG/M2 | WEIGHT: 291 LBS | BODY MASS INDEX: 54.98 KG/M2

## 2023-08-08 DIAGNOSIS — E66.9 OBESITY: Primary | ICD-10-CM

## 2023-08-08 DIAGNOSIS — E66.9 OBESITY, UNSPECIFIED CLASSIFICATION, UNSPECIFIED OBESITY TYPE, UNSPECIFIED WHETHER SERIOUS COMORBIDITY PRESENT: Primary | ICD-10-CM

## 2023-08-08 NOTE — PROGRESS NOTES
Patient attended preop education visit with team. Patient missed 5 on questions 13-18. Corrections were discussed. No issues noted. Patient's weight today is 291 lbs. She has lost 5.7 lbs.    GERMAINE Burris, CPT, CHC   
Yes

## 2023-08-08 NOTE — PROGRESS NOTES
Pt here for 2 week pre op reviwed, reviewed pre op diet, T/F test and pre op questions.      Current wt 291#   5.7# wt loss

## 2023-08-10 ENCOUNTER — LAB VISIT (OUTPATIENT)
Dept: LAB | Facility: HOSPITAL | Age: 48
End: 2023-08-10
Attending: SURGERY
Payer: COMMERCIAL

## 2023-08-10 DIAGNOSIS — Z01.818 PREOP TESTING: Primary | ICD-10-CM

## 2023-08-10 LAB
ALBUMIN SERPL-MCNC: 3.8 G/DL (ref 3.5–5)
ALBUMIN/GLOB SERPL: 1.3 RATIO (ref 1.1–2)
ALP SERPL-CCNC: 92 UNIT/L (ref 40–150)
ALT SERPL-CCNC: 14 UNIT/L (ref 0–55)
APTT PPP: 31 SECONDS (ref 23.2–33.7)
AST SERPL-CCNC: 14 UNIT/L (ref 5–34)
BILIRUB SERPL-MCNC: 1.3 MG/DL
BUN SERPL-MCNC: 10.3 MG/DL (ref 7–18.7)
CALCIUM SERPL-MCNC: 9.3 MG/DL (ref 8.4–10.2)
CHLORIDE SERPL-SCNC: 110 MMOL/L (ref 98–107)
CO2 SERPL-SCNC: 23 MMOL/L (ref 22–29)
CREAT SERPL-MCNC: 0.78 MG/DL (ref 0.55–1.02)
ERYTHROCYTE [DISTWIDTH] IN BLOOD BY AUTOMATED COUNT: 13.3 % (ref 11.5–17)
GFR SERPLBLD CREATININE-BSD FMLA CKD-EPI: >60 MLS/MIN/1.73/M2
GLOBULIN SER-MCNC: 2.9 GM/DL (ref 2.4–3.5)
GLUCOSE SERPL-MCNC: 102 MG/DL (ref 74–100)
HCT VFR BLD AUTO: 41.9 % (ref 37–47)
HGB BLD-MCNC: 13.7 G/DL (ref 12–16)
MCH RBC QN AUTO: 28.8 PG (ref 27–31)
MCHC RBC AUTO-ENTMCNC: 32.7 G/DL (ref 33–36)
MCV RBC AUTO: 88.2 FL (ref 80–94)
NRBC BLD AUTO-RTO: 0 %
PLATELET # BLD AUTO: 254 X10(3)/MCL (ref 130–400)
PMV BLD AUTO: 10.6 FL (ref 7.4–10.4)
POTASSIUM SERPL-SCNC: 4.2 MMOL/L (ref 3.5–5.1)
PROT SERPL-MCNC: 6.7 GM/DL (ref 6.4–8.3)
RBC # BLD AUTO: 4.75 X10(6)/MCL (ref 4.2–5.4)
SODIUM SERPL-SCNC: 139 MMOL/L (ref 136–145)
WBC # SPEC AUTO: 4.95 X10(3)/MCL (ref 4.5–11.5)

## 2023-08-10 PROCEDURE — 36415 COLL VENOUS BLD VENIPUNCTURE: CPT

## 2023-08-10 PROCEDURE — 85027 COMPLETE CBC AUTOMATED: CPT

## 2023-08-10 PROCEDURE — 80053 COMPREHEN METABOLIC PANEL: CPT

## 2023-08-10 PROCEDURE — 85730 THROMBOPLASTIN TIME PARTIAL: CPT

## 2023-08-14 ENCOUNTER — PATIENT MESSAGE (OUTPATIENT)
Dept: ADMINISTRATIVE | Facility: OTHER | Age: 48
End: 2023-08-14
Payer: COMMERCIAL

## 2023-08-14 ENCOUNTER — CLINICAL SUPPORT (OUTPATIENT)
Dept: BARIATRICS | Facility: HOSPITAL | Age: 48
End: 2023-08-14

## 2023-08-14 VITALS — WEIGHT: 289.19 LBS | BODY MASS INDEX: 54.64 KG/M2

## 2023-08-14 DIAGNOSIS — E66.9 OBESITY, UNSPECIFIED CLASSIFICATION, UNSPECIFIED OBESITY TYPE, UNSPECIFIED WHETHER SERIOUS COMORBIDITY PRESENT: Primary | ICD-10-CM

## 2023-08-15 ENCOUNTER — ANESTHESIA EVENT (OUTPATIENT)
Dept: SURGERY | Facility: HOSPITAL | Age: 48
DRG: 621 | End: 2023-08-15
Payer: COMMERCIAL

## 2023-08-16 ENCOUNTER — PATIENT MESSAGE (OUTPATIENT)
Dept: ADMINISTRATIVE | Facility: OTHER | Age: 48
End: 2023-08-16
Payer: COMMERCIAL

## 2023-08-17 NOTE — DISCHARGE INSTRUCTIONS
HOSPITAL DISCHARGE INSTRUCTIONS    Clinic Phone Numbers       Surgeons office number and after hours on call surgeon: [phone].    ALWAYS call the surgeons office PRIOR to going to an Urgent Care or the emergency room. If medical emergency, call 911.     Signs and Symptoms that would warrant a phone call of the office (regardless of the time of day):     Fever greater than 101 F     Uncontrolled pain that does not improve with pain medication     Uncontrolled nausea that does not improve with nausea medication      Vomiting     Shortness of breath     Chest Pain     Foul smelling drainage from incision and/or yellow or green drainage from incision     Red, hot painful incisions     Bloody bowel movements     ** If you feel as though it is a life-threatening emergency, call 911 and go to the emergency room**          Prescriptions     Medications     Pain medication (if needed) Tylenol over the counter is safe to take for discomfort     Anti-nausea medication (if needed)     Proton Pump Inhibitor (finish all 30 days), call [phone] if you did not receive 30 days of medication       Supplements     Chewable multivitamin- take 2 times a day (unless otherwise directed)     Chewable Calcium Citrate with D3- take 3 times a day (unless otherwise directed)     Iron tablet- take once daily      MiraLAX- take once daily for 2 weeks       Home Medications     Please review your medication list that you received at pre-op class as well as at the hospital instructions upon discharge to assure you are resuming all medications that are deemed  safe after surgery.      ** REMINDER- You should have scheduled your follow-up with your prescribing doctor for 1-week post-op**          Appointments     Surgeons Post-op Visits- please review orange sheet you received in the mail after surgery. Call [phone] if you need to reschedule your surgeons post-op visit.      Bariatric Team Post-op Visits- please review blue sheet  you received in your e-mail or please reference [facility] for your post-op appointments. . Call [phone] option 1 if you need to reschedule your bariatric team post-op visit.          Nutritional Considerations     Hydration is CRITICAL!     Daily fluid intake of  oz water     Water is more important than protein      Review list of allowable and non-allowable liquids in your Bariatric Booklet    The only fluids that count towards your water goal is water, sugar free, caffeine free flavored water        Diet progression          Continue the dietary protocol until you meet with the dietitian at your 2-week visit     Strive to reach protein goal. Only liquids counted toward your protein goal are protein shake, milk and approved yogurt     It is MANDATORY that you do not progress your diet without speaking to the dietitian to prevent potential complications          Incisional Care     Wash your hands before you touch your incisions or dressings     Remove any gauze or dressing over your incision. ONLY steri-strips (butter-fly band aids) should remain over your incision     Shower daily with an anti-bacterial soap (Hibiclens or Dial, orange bar).      Allow water to hit your back in the shower     Wet the incisions with water     Apply soap to a clean washcloth and wash over your incisions (do not scrub)     Rinse your incision with water and pat dry with a clean towel      Check your incisions daily for any redness, swelling, hot to touch, or bright red, green or yellow drainage.             Dark red, dried blood, indention of an incision, bruising may appear under the steri-strips. This is normal.     Do not apply any creams, ointments, etc. on the incisions.      Leave incision open to air (unless instructed otherwise)          Activity     Walk and/or ride a stationary bike 20 minutes a day     Do not lift, pull or push anything greater than 10 pounds for 4-6 weeks     Do not go the gym until you are  4-6 weeks post-op     Use your incentive spirometer (breathing machine) 10 x an hour for 1-2 weeks     Shower daily, DO NOT submerge yourself in water until 2 weeks post-op and cleared by surgeon          Post-Operative Expectations     A soreness is to be expected. Pain differs for everyone. Please refer to the pain scale and list of when to call the doctor regarding pain.     Nausea can last a few weeks after surgery due to the body getting adjusted to the new small stomach. Take anti-nausea as needed and stay HYDRATED. Slight dehydration will cause nausea.     Constipation is common after surgery. Take MiraLAX daily even if your bowel movements are regular. Please refer to the FAQs regarding constipation. Water is essential in preventing constipation.         Constipation after Bariatric Surgery  The Basics     ***Due to the change in your diet just prior to surgery and immediately after surgery, it is very common to have a change in your bowel movements in the immediate post op period. It is completely normal to not have a bowel movement everyday in the first few weeks after bariatric surgery due to decreased oral intake. It is common not to have your first bowel movement for 4-5 days after surgery. If you don't have your first bowel movement 7 days after surgery, please contact surgeon's office to discuss.     What is constipation? -- Constipation is a common problem that makes it hard to have bowel movements. Your bowel movements might be:  Too hard  Too small  Hard to get out  Happening fewer than 3 times a week  What causes constipation after bariatric surgery? -- Constipation can be caused by:  High protein diet and decrease in water intake after bariatric surgery  What other symptoms should I watch for? -- These symptoms could signal a more serious problem:  Blood in the toilet or on the toilet paper after having a bowel movement  Fever  Feeling weak  It could also be a sign of a problem if you have new  constipation without a change in your medicines or diet, and have never had constipation in the past.   Is there anything I can do on my own to get rid of constipation? -- Yes. Try these steps:  Begin your MiraLAX the first day at home and continue it everyday if you are having normal soft bowel movement, even after the two weeks.   Please call the office [phone] if you do not have a bowel movement within 5 days of surgery.   Okay to take over the counter stool softeners once to twice per day, in addition to daily Miralax, if needed to help with post op constipation.   If you begin to have mutliple loose bowel movements (more than 3 per day), discontinue the stool softeners and Miralax.   If you continue to have multiple loose bowel movements per day (more than 5 loose bowel movements per day for more than 2 days in a row) after you have quit taking stool softeners and Miralax, contact surgeon's office to discuss this.  [phone]

## 2023-08-18 ENCOUNTER — PATIENT MESSAGE (OUTPATIENT)
Dept: ADMINISTRATIVE | Facility: OTHER | Age: 48
End: 2023-08-18
Payer: COMMERCIAL

## 2023-08-20 ENCOUNTER — PATIENT MESSAGE (OUTPATIENT)
Dept: ADMINISTRATIVE | Facility: OTHER | Age: 48
End: 2023-08-20
Payer: COMMERCIAL

## 2023-08-21 ENCOUNTER — ANESTHESIA (OUTPATIENT)
Dept: SURGERY | Facility: HOSPITAL | Age: 48
DRG: 621 | End: 2023-08-21
Payer: COMMERCIAL

## 2023-08-21 ENCOUNTER — HOSPITAL ENCOUNTER (INPATIENT)
Facility: HOSPITAL | Age: 48
LOS: 1 days | Discharge: HOME | DRG: 621 | End: 2023-08-22
Attending: SURGERY | Admitting: SURGERY
Payer: COMMERCIAL

## 2023-08-21 DIAGNOSIS — E66.01 MORBID OBESITY: ICD-10-CM

## 2023-08-21 LAB
B-HCG UR QL: NEGATIVE
CTP QC/QA: YES
GROUP & RH: NORMAL
INDIRECT COOMBS GEL: NORMAL
POCT GLUCOSE: 118 MG/DL (ref 70–110)
POCT GLUCOSE: 122 MG/DL (ref 70–110)
SPECIMEN OUTDATE: NORMAL

## 2023-08-21 PROCEDURE — 71000039 HC RECOVERY, EACH ADD'L HOUR: Performed by: SURGERY

## 2023-08-21 PROCEDURE — 25000003 PHARM REV CODE 250

## 2023-08-21 PROCEDURE — 37000008 HC ANESTHESIA 1ST 15 MINUTES: Performed by: SURGERY

## 2023-08-21 PROCEDURE — 63600175 PHARM REV CODE 636 W HCPCS: Performed by: NURSE ANESTHETIST, CERTIFIED REGISTERED

## 2023-08-21 PROCEDURE — 86900 BLOOD TYPING SEROLOGIC ABO: CPT | Performed by: NURSE PRACTITIONER

## 2023-08-21 PROCEDURE — C9290 INJ, BUPIVACAINE LIPOSOME: HCPCS | Performed by: SURGERY

## 2023-08-21 PROCEDURE — 36000711: Performed by: SURGERY

## 2023-08-21 PROCEDURE — 63600175 PHARM REV CODE 636 W HCPCS: Performed by: SURGERY

## 2023-08-21 PROCEDURE — 36000710: Performed by: SURGERY

## 2023-08-21 PROCEDURE — 11000001 HC ACUTE MED/SURG PRIVATE ROOM

## 2023-08-21 PROCEDURE — 25000003 PHARM REV CODE 250: Performed by: NURSE ANESTHETIST, CERTIFIED REGISTERED

## 2023-08-21 PROCEDURE — 27201423 OPTIME MED/SURG SUP & DEVICES STERILE SUPPLY: Performed by: SURGERY

## 2023-08-21 PROCEDURE — 37000009 HC ANESTHESIA EA ADD 15 MINS: Performed by: SURGERY

## 2023-08-21 PROCEDURE — 25000003 PHARM REV CODE 250: Performed by: NURSE PRACTITIONER

## 2023-08-21 PROCEDURE — 63600175 PHARM REV CODE 636 W HCPCS: Performed by: NURSE PRACTITIONER

## 2023-08-21 PROCEDURE — D9220A PRA ANESTHESIA: Mod: ANES,,, | Performed by: ANESTHESIOLOGY

## 2023-08-21 PROCEDURE — 81025 URINE PREGNANCY TEST: CPT | Performed by: SURGERY

## 2023-08-21 PROCEDURE — 71000033 HC RECOVERY, INTIAL HOUR: Performed by: SURGERY

## 2023-08-21 PROCEDURE — 71000015 HC POSTOP RECOV 1ST HR: Performed by: SURGERY

## 2023-08-21 PROCEDURE — D9220A PRA ANESTHESIA: Mod: CRNA,,, | Performed by: NURSE ANESTHETIST, CERTIFIED REGISTERED

## 2023-08-21 PROCEDURE — D9220A PRA ANESTHESIA: ICD-10-PCS | Mod: CRNA,,, | Performed by: NURSE ANESTHETIST, CERTIFIED REGISTERED

## 2023-08-21 PROCEDURE — D9220A PRA ANESTHESIA: ICD-10-PCS | Mod: ANES,,, | Performed by: ANESTHESIOLOGY

## 2023-08-21 RX ORDER — METOPROLOL SUCCINATE 50 MG/1
50 TABLET, EXTENDED RELEASE ORAL DAILY
Status: DISCONTINUED | OUTPATIENT
Start: 2023-08-22 | End: 2023-08-22 | Stop reason: HOSPADM

## 2023-08-21 RX ORDER — PROMETHAZINE HYDROCHLORIDE 25 MG/ML
12.5 INJECTION, SOLUTION INTRAMUSCULAR; INTRAVENOUS EVERY 6 HOURS PRN
Status: ACTIVE | OUTPATIENT
Start: 2023-08-21

## 2023-08-21 RX ORDER — ONDANSETRON 2 MG/ML
4 INJECTION INTRAMUSCULAR; INTRAVENOUS EVERY 4 HOURS PRN
Status: DISPENSED | OUTPATIENT
Start: 2023-08-21

## 2023-08-21 RX ORDER — IBUPROFEN 200 MG
16 TABLET ORAL
Status: ACTIVE | OUTPATIENT
Start: 2023-08-21

## 2023-08-21 RX ORDER — PHENYLEPHRINE HYDROCHLORIDE 10 MG/ML
INJECTION INTRAVENOUS
Status: DISCONTINUED | OUTPATIENT
Start: 2023-08-21 | End: 2023-08-21

## 2023-08-21 RX ORDER — GLYCOPYRROLATE 0.2 MG/ML
INJECTION INTRAMUSCULAR; INTRAVENOUS
Status: DISCONTINUED | OUTPATIENT
Start: 2023-08-21 | End: 2023-08-21

## 2023-08-21 RX ORDER — ONDANSETRON 2 MG/ML
INJECTION INTRAMUSCULAR; INTRAVENOUS
Status: DISCONTINUED | OUTPATIENT
Start: 2023-08-21 | End: 2023-08-21

## 2023-08-21 RX ORDER — METHOCARBAMOL 100 MG/ML
INJECTION, SOLUTION INTRAMUSCULAR; INTRAVENOUS
Status: DISPENSED
Start: 2023-08-21 | End: 2023-08-21

## 2023-08-21 RX ORDER — KETOROLAC TROMETHAMINE 30 MG/ML
30 INJECTION, SOLUTION INTRAMUSCULAR; INTRAVENOUS EVERY 6 HOURS
Status: DISPENSED | OUTPATIENT
Start: 2023-08-21 | End: 2023-08-24

## 2023-08-21 RX ORDER — INSULIN ASPART 100 [IU]/ML
1-10 INJECTION, SOLUTION INTRAVENOUS; SUBCUTANEOUS
Status: ACTIVE | OUTPATIENT
Start: 2023-08-21

## 2023-08-21 RX ORDER — LIDOCAINE HYDROCHLORIDE 20 MG/ML
INJECTION INTRAVENOUS
Status: DISCONTINUED | OUTPATIENT
Start: 2023-08-21 | End: 2023-08-21

## 2023-08-21 RX ORDER — MIDAZOLAM HYDROCHLORIDE 1 MG/ML
INJECTION INTRAMUSCULAR; INTRAVENOUS
Status: DISCONTINUED | OUTPATIENT
Start: 2023-08-21 | End: 2023-08-21

## 2023-08-21 RX ORDER — BUPIVACAINE HYDROCHLORIDE 5 MG/ML
INJECTION, SOLUTION EPIDURAL; INTRACAUDAL
Status: DISCONTINUED | OUTPATIENT
Start: 2023-08-21 | End: 2023-08-21 | Stop reason: HOSPADM

## 2023-08-21 RX ORDER — IBUPROFEN 200 MG
24 TABLET ORAL
Status: ACTIVE | OUTPATIENT
Start: 2023-08-21

## 2023-08-21 RX ORDER — KETOROLAC TROMETHAMINE 10 MG/1
10 TABLET, FILM COATED ORAL EVERY 6 HOURS
Qty: 12 TABLET | Refills: 0 | Status: SHIPPED | OUTPATIENT
Start: 2023-08-21 | End: 2023-08-24

## 2023-08-21 RX ORDER — PROPOFOL 10 MG/ML
VIAL (ML) INTRAVENOUS
Status: DISCONTINUED | OUTPATIENT
Start: 2023-08-21 | End: 2023-08-21

## 2023-08-21 RX ORDER — SODIUM CHLORIDE, SODIUM LACTATE, POTASSIUM CHLORIDE, CALCIUM CHLORIDE 600; 310; 30; 20 MG/100ML; MG/100ML; MG/100ML; MG/100ML
INJECTION, SOLUTION INTRAVENOUS CONTINUOUS
Status: ACTIVE | OUTPATIENT
Start: 2023-08-21

## 2023-08-21 RX ORDER — LABETALOL HYDROCHLORIDE 5 MG/ML
INJECTION, SOLUTION INTRAVENOUS
Status: DISPENSED
Start: 2023-08-21 | End: 2023-08-21

## 2023-08-21 RX ORDER — ENOXAPARIN SODIUM 100 MG/ML
40 INJECTION SUBCUTANEOUS DAILY
Status: DISPENSED | OUTPATIENT
Start: 2023-08-22

## 2023-08-21 RX ORDER — LABETALOL HYDROCHLORIDE 5 MG/ML
10 INJECTION, SOLUTION INTRAVENOUS
Status: ACTIVE | OUTPATIENT
Start: 2023-08-21

## 2023-08-21 RX ORDER — SODIUM CHLORIDE, SODIUM LACTATE, POTASSIUM CHLORIDE, CALCIUM CHLORIDE 600; 310; 30; 20 MG/100ML; MG/100ML; MG/100ML; MG/100ML
INJECTION, SOLUTION INTRAVENOUS CONTINUOUS
Status: ACTIVE | OUTPATIENT
Start: 2023-08-21 | End: 2023-08-21

## 2023-08-21 RX ORDER — HYDRALAZINE HYDROCHLORIDE 20 MG/ML
10 INJECTION INTRAMUSCULAR; INTRAVENOUS EVERY 6 HOURS PRN
Status: DISPENSED | OUTPATIENT
Start: 2023-08-21

## 2023-08-21 RX ORDER — ROCURONIUM BROMIDE 10 MG/ML
INJECTION, SOLUTION INTRAVENOUS
Status: DISCONTINUED | OUTPATIENT
Start: 2023-08-21 | End: 2023-08-21

## 2023-08-21 RX ORDER — ACETAMINOPHEN 500 MG
1000 TABLET ORAL
Status: COMPLETED | OUTPATIENT
Start: 2023-08-21 | End: 2023-08-21

## 2023-08-21 RX ORDER — ONDANSETRON 4 MG/1
4 TABLET, ORALLY DISINTEGRATING ORAL EVERY 6 HOURS PRN
Status: ACTIVE | OUTPATIENT
Start: 2023-08-22

## 2023-08-21 RX ORDER — GABAPENTIN 300 MG/1
600 CAPSULE ORAL ONCE
Status: COMPLETED | OUTPATIENT
Start: 2023-08-21 | End: 2023-08-21

## 2023-08-21 RX ORDER — PANTOPRAZOLE SODIUM 40 MG/1
40 TABLET, DELAYED RELEASE ORAL DAILY
Status: DISPENSED | OUTPATIENT
Start: 2023-08-22

## 2023-08-21 RX ORDER — TRAMADOL HYDROCHLORIDE 50 MG/1
100 TABLET ORAL EVERY 6 HOURS PRN
Status: ACTIVE | OUTPATIENT
Start: 2023-08-22

## 2023-08-21 RX ORDER — GLUCAGON 1 MG
1 KIT INJECTION
Status: ACTIVE | OUTPATIENT
Start: 2023-08-21

## 2023-08-21 RX ORDER — ACETAMINOPHEN 10 MG/ML
1000 INJECTION, SOLUTION INTRAVENOUS EVERY 8 HOURS
Status: COMPLETED | OUTPATIENT
Start: 2023-08-21 | End: 2023-08-22

## 2023-08-21 RX ORDER — PROCHLORPERAZINE EDISYLATE 5 MG/ML
5 INJECTION INTRAMUSCULAR; INTRAVENOUS EVERY 6 HOURS PRN
Status: DISPENSED | OUTPATIENT
Start: 2023-08-21

## 2023-08-21 RX ORDER — CLONIDINE 0.1 MG/24H
1 PATCH, EXTENDED RELEASE TRANSDERMAL ONCE AS NEEDED
Status: DISPENSED | OUTPATIENT
Start: 2023-08-21 | End: 2035-01-16

## 2023-08-21 RX ORDER — FENTANYL CITRATE 50 UG/ML
INJECTION, SOLUTION INTRAMUSCULAR; INTRAVENOUS
Status: DISCONTINUED | OUTPATIENT
Start: 2023-08-21 | End: 2023-08-21

## 2023-08-21 RX ORDER — ACETAMINOPHEN 500 MG
1000 TABLET ORAL EVERY 8 HOURS
Status: DISCONTINUED | OUTPATIENT
Start: 2023-08-22 | End: 2023-08-21

## 2023-08-21 RX ORDER — ONDANSETRON 4 MG/1
4 TABLET, ORALLY DISINTEGRATING ORAL ONCE
Status: COMPLETED | OUTPATIENT
Start: 2023-08-21 | End: 2023-08-21

## 2023-08-21 RX ORDER — ENOXAPARIN SODIUM 100 MG/ML
40 INJECTION SUBCUTANEOUS
Status: COMPLETED | OUTPATIENT
Start: 2023-08-21 | End: 2023-08-21

## 2023-08-21 RX ORDER — LOSARTAN POTASSIUM 50 MG/1
100 TABLET ORAL DAILY
Status: DISCONTINUED | OUTPATIENT
Start: 2023-08-22 | End: 2023-08-22 | Stop reason: HOSPADM

## 2023-08-21 RX ORDER — AMITRIPTYLINE HYDROCHLORIDE 50 MG/1
50 TABLET, FILM COATED ORAL NIGHTLY
Status: DISCONTINUED | OUTPATIENT
Start: 2023-08-22 | End: 2023-08-22 | Stop reason: HOSPADM

## 2023-08-21 RX ORDER — CLONIDINE 0.2 MG/24H
PATCH, EXTENDED RELEASE TRANSDERMAL
Status: DISCONTINUED
Start: 2023-08-21 | End: 2023-08-22 | Stop reason: HOSPADM

## 2023-08-21 RX ORDER — ONDANSETRON 4 MG/1
4 TABLET, ORALLY DISINTEGRATING ORAL EVERY 6 HOURS PRN
Qty: 20 TABLET | Refills: 0 | Status: SHIPPED | OUTPATIENT
Start: 2023-08-21 | End: 2023-11-02

## 2023-08-21 RX ORDER — CELECOXIB 200 MG/1
200 CAPSULE ORAL ONCE
Status: COMPLETED | OUTPATIENT
Start: 2023-08-21 | End: 2023-08-21

## 2023-08-21 RX ORDER — CLONIDINE 0.2 MG/24H
1 PATCH, EXTENDED RELEASE TRANSDERMAL
Status: DISCONTINUED | OUTPATIENT
Start: 2023-08-21 | End: 2023-08-21 | Stop reason: HOSPADM

## 2023-08-21 RX ORDER — PANTOPRAZOLE SODIUM 40 MG/1
40 TABLET, DELAYED RELEASE ORAL DAILY
Qty: 30 TABLET | Refills: 0 | Status: SHIPPED | OUTPATIENT
Start: 2023-08-22 | End: 2023-12-20

## 2023-08-21 RX ORDER — PROMETHAZINE HYDROCHLORIDE 12.5 MG/1
12.5 TABLET ORAL EVERY 6 HOURS PRN
Qty: 20 TABLET | Refills: 0 | Status: SHIPPED | OUTPATIENT
Start: 2023-08-21 | End: 2023-11-02

## 2023-08-21 RX ORDER — ACETAMINOPHEN 500 MG
1000 TABLET ORAL EVERY 8 HOURS
Qty: 18 TABLET | Refills: 0 | Status: SHIPPED | OUTPATIENT
Start: 2023-08-21 | End: 2023-08-24

## 2023-08-21 RX ORDER — CEFAZOLIN SODIUM 2 G/50ML
2 SOLUTION INTRAVENOUS
Status: COMPLETED | OUTPATIENT
Start: 2023-08-21 | End: 2023-08-21

## 2023-08-21 RX ORDER — DEXAMETHASONE SODIUM PHOSPHATE 4 MG/ML
INJECTION, SOLUTION INTRA-ARTICULAR; INTRALESIONAL; INTRAMUSCULAR; INTRAVENOUS; SOFT TISSUE
Status: DISCONTINUED | OUTPATIENT
Start: 2023-08-21 | End: 2023-08-21

## 2023-08-21 RX ORDER — HYDROMORPHONE HYDROCHLORIDE 2 MG/ML
INJECTION, SOLUTION INTRAMUSCULAR; INTRAVENOUS; SUBCUTANEOUS
Status: DISCONTINUED | OUTPATIENT
Start: 2023-08-21 | End: 2023-08-21

## 2023-08-21 RX ADMIN — HYDROMORPHONE HYDROCHLORIDE 1 MG: 2 INJECTION, SOLUTION INTRAMUSCULAR; INTRAVENOUS; SUBCUTANEOUS at 10:08

## 2023-08-21 RX ADMIN — FENTANYL CITRATE 100 MCG: 50 INJECTION, SOLUTION INTRAMUSCULAR; INTRAVENOUS at 09:08

## 2023-08-21 RX ADMIN — MIDAZOLAM HYDROCHLORIDE 2 MG: 1 INJECTION, SOLUTION INTRAMUSCULAR; INTRAVENOUS at 09:08

## 2023-08-21 RX ADMIN — GLYCOPYRROLATE 0.2 MG: 0.2 INJECTION INTRAMUSCULAR; INTRAVENOUS at 09:08

## 2023-08-21 RX ADMIN — KETOROLAC TROMETHAMINE 30 MG: 30 INJECTION, SOLUTION INTRAMUSCULAR; INTRAVENOUS at 11:08

## 2023-08-21 RX ADMIN — CELECOXIB 200 MG: 200 CAPSULE ORAL at 07:08

## 2023-08-21 RX ADMIN — PROPOFOL 150 MG: 10 INJECTION, EMULSION INTRAVENOUS at 09:08

## 2023-08-21 RX ADMIN — CLONIDINE 1 PATCH: 0.2 PATCH TRANSDERMAL at 12:08

## 2023-08-21 RX ADMIN — SODIUM CHLORIDE, POTASSIUM CHLORIDE, SODIUM LACTATE AND CALCIUM CHLORIDE: 600; 310; 30; 20 INJECTION, SOLUTION INTRAVENOUS at 11:08

## 2023-08-21 RX ADMIN — ONDANSETRON 4 MG: 4 TABLET, ORALLY DISINTEGRATING ORAL at 07:08

## 2023-08-21 RX ADMIN — ENOXAPARIN SODIUM 40 MG: 40 INJECTION SUBCUTANEOUS at 07:08

## 2023-08-21 RX ADMIN — ROCURONIUM BROMIDE 20 MG: 10 SOLUTION INTRAVENOUS at 10:08

## 2023-08-21 RX ADMIN — PROCHLORPERAZINE EDISYLATE 5 MG: 5 INJECTION INTRAMUSCULAR; INTRAVENOUS at 07:08

## 2023-08-21 RX ADMIN — LORAZEPAM 0.5 MG: 2 INJECTION INTRAMUSCULAR; INTRAVENOUS at 11:08

## 2023-08-21 RX ADMIN — CEFAZOLIN SODIUM 2 G: 2 SOLUTION INTRAVENOUS at 10:08

## 2023-08-21 RX ADMIN — LABETALOL HYDROCHLORIDE 10 MG: 5 INJECTION, SOLUTION INTRAVENOUS at 11:08

## 2023-08-21 RX ADMIN — ACETAMINOPHEN 1000 MG: 500 TABLET, FILM COATED ORAL at 07:08

## 2023-08-21 RX ADMIN — PHENYLEPHRINE HYDROCHLORIDE 100 MCG: 10 INJECTION INTRAVENOUS at 10:08

## 2023-08-21 RX ADMIN — SODIUM CHLORIDE, SODIUM GLUCONATE, SODIUM ACETATE, POTASSIUM CHLORIDE AND MAGNESIUM CHLORIDE: 526; 502; 368; 37; 30 INJECTION, SOLUTION INTRAVENOUS at 09:08

## 2023-08-21 RX ADMIN — ACETAMINOPHEN 1000 MG: 10 INJECTION, SOLUTION INTRAVENOUS at 02:08

## 2023-08-21 RX ADMIN — METHOCARBAMOL 1000 MG: 100 INJECTION INTRAMUSCULAR; INTRAVENOUS at 11:08

## 2023-08-21 RX ADMIN — ONDANSETRON 4 MG: 2 INJECTION INTRAMUSCULAR; INTRAVENOUS at 06:08

## 2023-08-21 RX ADMIN — DEXAMETHASONE SODIUM PHOSPHATE 4 MG: 4 INJECTION, SOLUTION INTRA-ARTICULAR; INTRALESIONAL; INTRAMUSCULAR; INTRAVENOUS; SOFT TISSUE at 10:08

## 2023-08-21 RX ADMIN — ACETAMINOPHEN 1000 MG: 10 INJECTION, SOLUTION INTRAVENOUS at 09:08

## 2023-08-21 RX ADMIN — ONDANSETRON 4 MG: 2 INJECTION INTRAMUSCULAR; INTRAVENOUS at 10:08

## 2023-08-21 RX ADMIN — ROCURONIUM BROMIDE 50 MG: 10 SOLUTION INTRAVENOUS at 09:08

## 2023-08-21 RX ADMIN — GABAPENTIN 600 MG: 300 CAPSULE ORAL at 07:08

## 2023-08-21 RX ADMIN — SUGAMMADEX 200 MG: 100 INJECTION, SOLUTION INTRAVENOUS at 10:08

## 2023-08-21 RX ADMIN — HYDRALAZINE HYDROCHLORIDE 10 MG: 20 INJECTION INTRAMUSCULAR; INTRAVENOUS at 11:08

## 2023-08-21 RX ADMIN — LIDOCAINE HYDROCHLORIDE 80 MG: 20 INJECTION INTRAVENOUS at 09:08

## 2023-08-21 RX ADMIN — KETOROLAC TROMETHAMINE 30 MG: 30 INJECTION, SOLUTION INTRAMUSCULAR; INTRAVENOUS at 01:08

## 2023-08-21 RX ADMIN — CLONIDINE 1 PATCH: 0.2 PATCH, EXTENDED RELEASE TRANSDERMAL at 12:08

## 2023-08-21 RX ADMIN — KETOROLAC TROMETHAMINE 30 MG: 30 INJECTION, SOLUTION INTRAMUSCULAR; INTRAVENOUS at 06:08

## 2023-08-21 NOTE — ANESTHESIA PREPROCEDURE EVALUATION
"Note written by MD Jabari, reviewed and edited by ELVIE Estes MD                                                                                                                   08/21/2023  Lynette Luna is a 47 y.o., female.  Procedure Information    Case: [id number] Date/Time: 08/21/23 0910   Procedure: GASTRECTOMY, SLEEVE, LAPAROSCOPIC (Abdomen) - WITH DR. MCWILLIAMS   Anesthesia type: General   Diagnosis: Morbid obesity [E66.01]   Pre-op diagnosis: Morbid obesity [E66.01]   Location: [facility] OR  / [facility] OR   Surgeons: Jerome Roth MD     Vital signs:  Pre Vitals     Current as of 08/21/23 0844  BP: 137/82 Pulse: 69   Resp: 18 SpO2: 96   Temp: 36.7 °C (98.1 °F)   Height: 5' 1" (1.549 m) (08/21/23) Weight: 129.9 kg (286 lb 6 oz) (08/21/23)   BMI: 54.1 IBW: 47.8 kg (105 lb 4.8 oz)   Last edited 08/21/23 0708 by GEMA      Pre-op Assessment    I have reviewed the Patient Summary Reports.     I have reviewed the Nursing Notes. I have reviewed the NPO Status.   I have reviewed the Medications.     Review of Systems  Anesthesia Hx:  No problems with previous Anesthesia    Hematology/Oncology:  Hematology Normal   Oncology Normal     EENT/Dental:EENT/Dental Normal   Cardiovascular:   Exercise tolerance: good Hypertension  Functional Capacity good / => 4 METS    Pulmonary:  Pulmonary Normal    Renal/:   Denies Chronic Renal Disease.     Hepatic/GI:  Hepatic/GI Normal    Musculoskeletal:  Musculoskeletal Normal    Neurological:   Headaches    Endocrine:  Endocrine Normal  Denies Morbid Obesity / BMI > 40  Dermatological:  Skin Normal    Psych:  Psychiatric Normal           Physical Exam  General: Alert, Oriented, Well nourished and Cooperative    Airway:  Mallampati: II   Mouth Opening: Normal  TM Distance: Normal  Tongue: Normal  Neck ROM: Normal ROM    Dental:  Intact    Chest/Lungs:  Clear to auscultation, Normal Respiratory Rate    Heart:  Rate: Normal  Rhythm: Regular Rhythm       Latest Reference Range & Units " Most Recent   WBC 4.50 - 11.50 x10(3)/mcL 4.95  8/10/23 10:59   RBC 4.20 - 5.40 x10(6)/mcL 4.75  8/10/23 10:59   Hemoglobin 12.0 - 16.0 g/dL 13.7  8/10/23 10:59   Hematocrit 37.0 - 47.0 % 41.9  8/10/23 10:59   MCV 80.0 - 94.0 fL 88.2  8/10/23 10:59   MCH 27.0 - 31.0 pg 28.8  8/10/23 10:59   MCHC 33.0 - 36.0 g/dL 32.7 (L)  8/10/23 10:59   RDW 11.5 - 17.0 % 13.3  8/10/23 10:59   Platelets 130 - 400 x10(3)/mcL 254  8/10/23 10:59   MPV 7.4 - 10.4 fL 10.6 (H)  8/10/23 10:59   Neut % % 63.9  11/29/22 10:37   LYMPH % % 27.5  11/29/22 10:37   Mono % % 8.6  11/29/22 10:37   Gran # (ANC) 2.1 - 9.2 x10(3)/mcL 2.8  11/23/21 10:49   Neut # 2.1 - 9.2 x10(3)/mcL 3.2  11/29/22 10:37   Lymph # 0.6 - 4.6 x10(3)/mcL 1.4  11/29/22 10:37   Mono # 0.1 - 1.3 x10(3)/mcL 0.4  11/29/22 10:37   nRBC % 0.0  8/10/23 10:59   aPTT 23.2 - 33.7 seconds 31.0  8/10/23 10:59   Sodium 136 - 145 mmol/L 139  8/10/23 10:59   Potassium 3.5 - 5.1 mmol/L 4.2  8/10/23 10:59   Chloride 98 - 107 mmol/L 110 (H)  8/10/23 10:59   CO2 22 - 29 mmol/L 23  8/10/23 10:59   BUN 7.0 - 18.7 mg/dL 10.3  8/10/23 10:59   Creatinine 0.55 - 1.02 mg/dL 0.78  8/10/23 10:59   eGFR mls/min/1.73/m2 >60  8/10/23 10:59   eGFR if non African American mL/min/1.73 m2 >60  11/23/21 10:49   eGFR if African American mL/min/1.73 m2 >60  11/23/21 10:49   Glucose 74 - 100 mg/dL 102 (H)  8/10/23 10:59   Calcium 8.4 - 10.2 mg/dL 9.3  8/10/23 10:59   Alkaline Phosphatase 40 - 150 unit/L 92  8/10/23 10:59   PROTEIN TOTAL 6.4 - 8.3 gm/dL 6.7  8/10/23 10:59   Albumin 3.5 - 5.0 g/dL 3.8  8/10/23 10:59   Albumin/Globulin Ratio 1.1 - 2.0 ratio 1.3  8/10/23 10:59   BILIRUBIN TOTAL <=1.5 mg/dL 1.3  8/10/23 10:59   Bilirubin Direct 0.0 - 0.5 mg/dL 0.2  11/29/22 10:37   Bilirubin, Indirect mg/dL 0.8  11/23/21 10:49   AST 5 - 34 unit/L 14  8/10/23 10:59   ALT 0 - 55 unit/L 14  8/10/23 10:59   Globulin, Total 2.4 - 3.5 gm/dL 2.9  8/10/23 10:59   Cholesterol 0 - 200 mg/dL 156  11/29/22 10:37   HDL 35 - 60  mg/dL 39  11/29/22 10:37   LDL Cholesterol External 0.0 - 129.0 mg/dL 96.0  11/23/21 10:49   LDL Cholesterol Direct <=129.0 mg/dL 94.0  11/29/22 10:37   Total Cholesterol/HDL Ratio  4  11/29/22 10:37   Triglycerides 30 - 150 mg/dL 97  11/29/22 10:37   Very Low Density Lipoprotein  19  11/29/22 10:37   Thyroid Stimulating Hormone 0.3500 - 4.9400 uIU/mL 2.4667  11/29/22 10:37   Hemoglobin A1C External % 5.6  11/29/22 10:37   Estimated Avg Glucose mg/dL 114.0  11/29/22 10:37   Preg Test, Ur Negative  Negative  7/19/23 10:05   Color, UA Yellow, Light-Yellow, Dark Yellow, Micheline, Straw  Yellow  11/29/22 10:37   Appearance, UA Clear  Clear  11/29/22 10:37   Specific Gravity,UA  1.025  11/29/22 10:37   pH, UA 5.0 - 8.5  5.5  11/29/22 10:37   Protein, UA Negative mg/dL Negative  11/29/22 10:37   Glucose, UA Negative, Normal mg/dL Negative  11/29/22 10:37   Ketones, UA Negative mg/dL Negative  11/29/22 10:37   Occult Blood UA Negative unit/L 2+ !  11/29/22 10:37   NITRITE UA Negative  Negative  11/29/22 10:37   UROBILINOGEN UA >0.2 mg/dL 0.2  11/23/21 10:49   Bilirubin (UA) >Negative mg/dL Negative  11/23/21 10:49   Bilirubin, UA Negative mg/dL Negative  11/29/22 10:37   Urobilinogen, UA 0.2, 1.0, Normal mg/dL 0.2  11/29/22 10:37   Leukocytes, UA Negative unit/L Negative  11/29/22 10:37   RBC, UA None Seen, 0-2, 3-5, 0-5 /HPF 0-2  11/29/22 10:37   WBC, UA None Seen, 0-2, 3-5, 0-5 /HPF 0-2  11/29/22 10:37   Bacteria, UA None Seen, Rare, Occasional /HPF None Seen  11/29/22 10:37   Squam Epithel, UA None Seen, Rare, Occasional, Occ /HPF Rare  11/29/22 10:37   POCT URINE PREGNANCY  Rpt  7/19/23 10:05    Acceptable  Yes  7/19/23 10:05   MAMMO DIGITAL DIAGNOSTIC LEFT WITH KELSI  Rpt  1/11/21 08:33   MAMMO DIGITAL SCREENING BILAT  Rpt  9/13/18 14:34   MAMMO DIGITAL SCREENING BILAT WITH KELSI  Rpt  5/24/23 09:40   LIQUID-BASED PAP SMEAR, SCREENING  Rpt  5/16/23 10:13   CRC Recommendation External  Repeat colonoscopy in  10 years (E)  12/14/21 00:00   Pathology Result  See Comments  5/16/23 10:13   (L): Data is abnormally low  (H): Data is abnormally high  !: Data is abnormal  Rpt: View report in Results Review for more information  (E): External lab result    Anesthesia Plan  Type of Anesthesia, risks & benefits discussed:    Anesthesia Type: Gen ETT  Intra-op Monitoring Plan: Standard ASA Monitors  Post Op Pain Control Plan: multimodal analgesia  Induction:  IV and Inhalation  Airway Plan: Direct  Informed Consent: Informed consent signed with the Patient and all parties understand the risks and agree with anesthesia plan.  All questions answered. Patient consented to blood products? Yes  ASA Score: 2  Day of Surgery Review of History & Physical: H&P Update referred to the surgeon/provider.    Ready For Surgery From Anesthesia Perspective.     .

## 2023-08-21 NOTE — NURSING
Nurses Note -- 4 Eyes      8/21/2023   5:47 PM      Skin assessed during: Admit      [x] No Altered Skin Integrity Present    []Prevention Measures Documented      [] Yes- Altered Skin Integrity Present or Discovered   [] LDA Added if Not in Epic (Describe Wound)   [] New Altered Skin Integrity was Present on Admit and Documented in LDA   [] Wound Image Taken    Wound Care Consulted? No    Attending Nurse:  Bertha Mooney RN/Staff Member:  Maddi Natarajan LPN

## 2023-08-21 NOTE — TRANSFER OF CARE
"Anesthesia Transfer of Care Note    Patient: Lynette Luna    Procedure(s) Performed: Procedure(s) (LRB):  GASTRECTOMY, SLEEVE, LAPAROSCOPIC (N/A)    Patient location: PACU    Anesthesia Type: general    Transport from OR: Transported from OR on room air with adequate spontaneous ventilation    Post pain: adequate analgesia    Post assessment: no apparent anesthetic complications and tolerated procedure well    Post vital signs: stable    Level of consciousness: responds to stimulation    Nausea/Vomiting: no nausea/vomiting    Complications: none    Transfer of care protocol was followed      Last vitals:   Visit Vitals  /77   Pulse 74   Temp 36.4 °C (97.5 °F)   Resp 10   Ht 5' 1" (1.549 m)   Wt 129.9 kg (286 lb 6 oz)   LMP 08/17/2023 (Exact Date)   SpO2 99%   Breastfeeding No   BMI 54.11 kg/m²     "

## 2023-08-21 NOTE — ANESTHESIA PROCEDURE NOTES
Intubation    Date/Time: 8/21/2023 9:56 AM    Performed by: Elena Alfaro CRNA  Authorized by: Eric Estes MD    Intubation:     Induction:  Intravenous    Intubated:  Postinduction    Mask Ventilation:  Easy mask    Attempts:  1    Attempted By:  CRNA    Method of Intubation:  Direct    Blade:  Cho 2    Laryngeal View Grade: Grade I - full view of cords      Difficult Airway Encountered?: No      Complications:  None    Airway Device:  Oral endotracheal tube    Airway Device Size:  7.0    Style/Cuff Inflation:  Cuffed (inflated to minimal occlusive pressure)    Inflation Amount (mL):  7    Tube secured:  22    Secured at:  The lips    Placement Verified By:  Capnometry    Complicating Factors:  None    Findings Post-Intubation:  BS equal bilateral and atraumatic/condition of teeth unchanged

## 2023-08-22 VITALS
DIASTOLIC BLOOD PRESSURE: 87 MMHG | RESPIRATION RATE: 20 BRPM | SYSTOLIC BLOOD PRESSURE: 133 MMHG | WEIGHT: 286 LBS | HEART RATE: 68 BPM | TEMPERATURE: 98 F | HEIGHT: 61 IN | BODY MASS INDEX: 54 KG/M2 | OXYGEN SATURATION: 98 %

## 2023-08-22 LAB
ANION GAP SERPL CALC-SCNC: 8 MEQ/L
BASOPHILS # BLD AUTO: 0.04 X10(3)/MCL
BASOPHILS NFR BLD AUTO: 0.8 %
BUN SERPL-MCNC: 11.1 MG/DL (ref 7–18.7)
CALCIUM SERPL-MCNC: 8.8 MG/DL (ref 8.4–10.2)
CHLORIDE SERPL-SCNC: 116 MMOL/L (ref 98–107)
CO2 SERPL-SCNC: 20 MMOL/L (ref 22–29)
CREAT SERPL-MCNC: 0.71 MG/DL (ref 0.55–1.02)
CREAT/UREA NIT SERPL: 16
EOSINOPHIL # BLD AUTO: 0.02 X10(3)/MCL (ref 0–0.9)
EOSINOPHIL NFR BLD AUTO: 0.4 %
ERYTHROCYTE [DISTWIDTH] IN BLOOD BY AUTOMATED COUNT: 13.8 % (ref 11.5–17)
GFR SERPLBLD CREATININE-BSD FMLA CKD-EPI: >60 MLS/MIN/1.73/M2
GLUCOSE SERPL-MCNC: 96 MG/DL (ref 74–100)
HCT VFR BLD AUTO: 34.9 % (ref 37–47)
HGB BLD-MCNC: 11.6 G/DL (ref 12–16)
IMM GRANULOCYTES # BLD AUTO: 0.01 X10(3)/MCL (ref 0–0.04)
IMM GRANULOCYTES NFR BLD AUTO: 0.2 %
LYMPHOCYTES # BLD AUTO: 1.35 X10(3)/MCL (ref 0.6–4.6)
LYMPHOCYTES NFR BLD AUTO: 25.3 %
MCH RBC QN AUTO: 29.1 PG (ref 27–31)
MCHC RBC AUTO-ENTMCNC: 33.2 G/DL (ref 33–36)
MCV RBC AUTO: 87.7 FL (ref 80–94)
MONOCYTES # BLD AUTO: 0.51 X10(3)/MCL (ref 0.1–1.3)
MONOCYTES NFR BLD AUTO: 9.6 %
NEUTROPHILS # BLD AUTO: 3.4 X10(3)/MCL (ref 2.1–9.2)
NEUTROPHILS NFR BLD AUTO: 63.7 %
NRBC BLD AUTO-RTO: 0 %
PLATELET # BLD AUTO: 266 X10(3)/MCL (ref 130–400)
PMV BLD AUTO: 10.6 FL (ref 7.4–10.4)
POTASSIUM SERPL-SCNC: 3.7 MMOL/L (ref 3.5–5.1)
RBC # BLD AUTO: 3.98 X10(6)/MCL (ref 4.2–5.4)
SODIUM SERPL-SCNC: 144 MMOL/L (ref 136–145)
WBC # SPEC AUTO: 5.33 X10(3)/MCL (ref 4.5–11.5)

## 2023-08-22 PROCEDURE — 85025 COMPLETE CBC W/AUTO DIFF WBC: CPT | Performed by: NURSE PRACTITIONER

## 2023-08-22 PROCEDURE — A4216 STERILE WATER/SALINE, 10 ML: HCPCS | Performed by: SURGERY

## 2023-08-22 PROCEDURE — 25000003 PHARM REV CODE 250: Performed by: NURSE PRACTITIONER

## 2023-08-22 PROCEDURE — 25000003 PHARM REV CODE 250: Performed by: SURGERY

## 2023-08-22 PROCEDURE — 63600175 PHARM REV CODE 636 W HCPCS: Performed by: NURSE PRACTITIONER

## 2023-08-22 PROCEDURE — 80048 BASIC METABOLIC PNL TOTAL CA: CPT | Performed by: NURSE PRACTITIONER

## 2023-08-22 RX ORDER — SODIUM CHLORIDE 0.9 % (FLUSH) 0.9 %
SYRINGE (ML) INJECTION
Status: DISCONTINUED | OUTPATIENT
Start: 2023-08-22 | End: 2023-08-22 | Stop reason: HOSPADM

## 2023-08-22 RX ADMIN — LOSARTAN POTASSIUM 100 MG: 50 TABLET, FILM COATED ORAL at 09:08

## 2023-08-22 RX ADMIN — METOPROLOL SUCCINATE 50 MG: 50 TABLET, EXTENDED RELEASE ORAL at 09:08

## 2023-08-22 RX ADMIN — PANTOPRAZOLE SODIUM 40 MG: 40 TABLET, DELAYED RELEASE ORAL at 09:08

## 2023-08-22 RX ADMIN — ENOXAPARIN SODIUM 40 MG: 40 INJECTION SUBCUTANEOUS at 09:08

## 2023-08-22 RX ADMIN — KETOROLAC TROMETHAMINE 30 MG: 30 INJECTION, SOLUTION INTRAMUSCULAR; INTRAVENOUS at 05:08

## 2023-08-22 RX ADMIN — ACETAMINOPHEN 1000 MG: 10 INJECTION, SOLUTION INTRAVENOUS at 09:08

## 2023-08-22 NOTE — NURSING
Pt d/c to home. Post-op instructions, Rx, & f/u appt given to pt & spouse. Pt in stable condition. All questions answered.

## 2023-08-22 NOTE — CONSULTS
"  [facility] - 8th Floor Med Surg  Adult Nutrition  Consult Note    SUMMARY     Recommendations    Recommendation/Intervention: Pt to continue nutritional monitoring and education in bariatric clinic.  Goals: Understanding of dietary guidelines and tolerance to diet.  Nutrition Goal Status: goal met    Assessment and Plan    No new Assessment & Plan notes have been filed under this hospital service since the last note was generated.  Service: Nutrition       Malnutrition Assessment                                       Reason for Assessment    Reason For Assessment: consult  Diagnosis: other (see comments) (s/p VSG)  Relevant Medical History: morbid obesity, HTN, hyperglycemia  Interdisciplinary Rounds: attended  General Information Comments: Pt was able to recite nutritional guidelines for home discharge.  Nutrition Discharge Planning: Pt to remain on bariatric clear liquids through home discharge.    Nutrition Risk Screen    Nutrition Risk Screen: no indicators present    Nutrition/Diet History    Spiritual, Cultural Beliefs, Buddhism Practices, Values that Affect Care: no  Factors Affecting Nutritional Intake: early satiety, clear liquid diet, decreased appetite (as expected)    Anthropometrics    Temp: 97.7 °F (36.5 °C)  Height Method: Stated  Height: 5' 1" (154.9 cm)  Height (inches): 61 in  Weight Method: Standard Scale  Weight: 129.7 kg (286 lb)  Weight (lb): 286 lb  Ideal Body Weight (IBW), Female: 105 lb  % Ideal Body Weight, Female (lb): 272.38 %  BMI (Calculated): 54.1  BMI Grade: greater than 40 - morbid obesity  Usual Body Weight (UBW), k kg  % Usual Body Weight: 98.48  % Weight Change From Usual Weight: -1.72 %       Lab/Procedures/Meds         Physical Findings/Assessment         Estimated/Assessed Needs    Weight Used For Calorie Calculations: 60 kg (132 lb 4.4 oz)  Energy Calorie Requirements (kcal): 9276-5270 kcals/d  Energy Need Method: Kcal/kg  Protein Requirements: " 60-66g/d  Weight Used For Protein Calculations: 60 kg (132 lb 4.4 oz)  Fluid Requirements (mL): 2594 ml/d  Estimated Fluid Requirement Method: other (see comments) (20ml/kg actual BW/d)  RDA Method (mL): 1200         Nutrition Prescription Ordered    Current Diet Order: Bariatric clear liquids  Nutrition Order Comments: Pt to continue on bariatric clear liquids through discharge.    Evaluation of Received Nutrient/Fluid Intake    Energy Calories Required: not meeting needs (as expected)  Protein Required: not meeting needs (as expected)  Fluid Required: meeting needs  Tolerance: tolerating  % Intake of Estimated Energy Needs: 0 - 25 %  % Meal Intake: 0 - 25 %    Nutrition Risk    Level of Risk/Frequency of Follow-up: low       Monitor and Evaluation    Food and Nutrient Intake: energy intake, food and beverage intake, enteral nutrition intake  Food and Nutrient Adminstration: diet order  Knowledge/Beliefs/Attitudes: food and nutrition knowledge/skill  Anthropometric Measurements: weight, weight change, body mass index       Nutrition Follow-Up    RD Follow-up?: Yes (2w bariatric follow up)

## 2023-08-22 NOTE — DISCHARGE SUMMARY
[facility] - 8th Floor Med Surg  General Surgery  Discharge Summary      Patient Name: Lynette Luna  MRN: [id number]  Admission Date: 8/21/2023  Hospital Length of Stay: 1 days  Discharge Date and Time:  08/22/2023 8:16 AM  Attending Physician: Jerome Roth MD   Discharging Provider: DELVIN Cast  Primary Care Provider: Marco Reyes MD       Admit Diagnosis:   Morbid Obesity    Discharge Diagnosis:   Morbid Obesity    Operations During Hospitalization: Laparoscopic Vertical Sleeve Gastrectomy 8/21/23 per Dr. Jerome Roth    Hospital Course: 47 yr old female admitted to [facility] for an elective bariatric procedure. Procedure performed as stated above. Upon completion of procedure, pt was transferred from the recovery room to the post surgical floor for further care and treatment. POD#1, afebrile, vital signs stable. The pt's diet was advanced to bariatric clear liquids.     Review of Systems: Mild incisional pain reported but tolerable. No nausea, vomiting, dysphagia, GERD. Patient ambulating in the room/hallway and voiding without difficulty. Pt denies any dizziness, palpitations, SOB, or CP. All other review of systems are negative.     Physical Examination:   Vital signs: stable, noted in chart  General: Awake and alert, able to answer all questions. Resting in bed with family member at bedside  Respiratory:  Clear to auscultation bilaterally  Cardio: Regular rate and rhythm.  Abdomen: Soft, non distended. Bowel sounds present to all 4 quadrants. Lap sites clean, dry, and intact. Abdomen benign.   Neuro: Alert and oriented x's 4.    Labs:  Unremarkable, see chart    Discharge Medication:   Protonix  Zofran  Phenergan  Tylenol, OTC  Toradol    Condition: Satisfactory    Disposition:  To home.   -Pt to f/u with Dr. Roth in two weeks  -Continue IS at home  -Walk 20min daily   -Continue bariatric clears throughout today and progress dietary protocol as instructed by dietitian  tomorrow upon waking up at home      Consults:   Consults (From admission, onward)          Status Ordering Provider     Inpatient consult to Registered Dietitian/Nutritionist  Once        Provider:  (Not yet assigned)    Acknowledged SHANON AMBROSE            Significant Diagnostic Studies: Labs: BMP:   Recent Labs   Lab 08/22/23  0525      K 3.7   CO2 20*   BUN 11.1   CREATININE 0.71   CALCIUM 8.8    and CBC   Recent Labs   Lab 08/22/23  0525   WBC 5.33   HGB 11.6*   HCT 34.9*          Pending Diagnostic Studies:       Procedure Component Value Units Date/Time    Pregnancy, urine rapid [[id number]]     Order Status: Sent Lab Status: No result     Specimen: Urine     Specimen to Pathology [[id number]] Collected: 08/21/23 1032    Order Status: Sent Lab Status: No result     Specimen: Tissue from Stomach           Final Active Diagnoses:    Diagnosis Date Noted POA    PRINCIPAL PROBLEM:  Morbid obesity with BMI of 50.0-59.9, adult [E66.01, Z68.43] 07/18/2023 Not Applicable     Chronic      Problems Resolved During this Admission:      Discharged Condition: good    Disposition: Home or Self Care    Follow Up:   Follow-up Information       Jerome Roth MD Follow up in 2 week(s).    Specialty: General Surgery  Contact information:  [address] [id number] [phone]                           Patient Instructions:   No discharge procedures on file.  Medications:  Reconciled Home Medications:      Medication List        START taking these medications      acetaminophen 500 MG tablet  Commonly known as: TYLENOL  Take 2 tablets (1,000 mg total) by mouth every 8 (eight) hours. for 3 days     ketorolac 10 mg tablet  Commonly known as: TORADOL  Take 1 tablet (10 mg total) by mouth every 6 (six) hours. for 3 days     ondansetron 4 MG Tbdl  Commonly known as: ZOFRAN-ODT  Take 1 tablet (4 mg total) by mouth every 6 (six) hours as needed (first line med PRN NV).     pantoprazole 40 MG  tablet  Commonly known as: PROTONIX  Take 1 tablet (40 mg total) by mouth once daily. Take daily for 30 days post op     promethazine 12.5 MG Tab  Commonly known as: PHENERGAN  Take 1 tablet (12.5 mg total) by mouth every 6 (six) hours as needed (second line med prn NV).            CONTINUE taking these medications      amitriptyline 50 MG tablet  Commonly known as: ELAVIL  Take 1 tablet (50 mg total) by mouth every evening.     losartan 100 MG tablet  Commonly known as: COZAAR  Take 1 tablet (100 mg total) by mouth once daily.     metoprolol succinate 50 MG 24 hr tablet  Commonly known as: TOPROL-XL  TAKE 1 TABLET BY MOUTH EVERY DAY     NURTEC 75 mg odt  Generic drug: rimegepant  TAKE 1 TABLET (75 MG TOTAL) BY MOUTH ONCE AS NEEDED FOR MIGRAINE. PLACE ODT TABLET ON THE TONGUE ALTERNATIVELY THE ODT TABLET MAY BE PLACED UNDER THE TONGUE            DC to home  FU 2 weeks  DC instructions given to pt and family. All questions answered.    Nicole Gallardo, DELVIN  General Surgery  [facility] - 8th Floor Med Surg

## 2023-08-22 NOTE — PLAN OF CARE
Problem: Adult Inpatient Plan of Care  Goal: Plan of Care Review  Outcome: Ongoing, Progressing  Goal: Patient-Specific Goal (Individualized)  Outcome: Ongoing, Progressing  Goal: Absence of Hospital-Acquired Illness or Injury  Outcome: Ongoing, Progressing  Goal: Optimal Comfort and Wellbeing  Outcome: Ongoing, Progressing  Goal: Readiness for Transition of Care  Outcome: Ongoing, Progressing     Problem: Bariatric Environmental Safety  Goal: Safety Maintained with Care  Outcome: Ongoing, Progressing     Problem: Fall Injury Risk  Goal: Absence of Fall and Fall-Related Injury  Outcome: Ongoing, Progressing     Problem: Pain Acute  Goal: Acceptable Pain Control and Functional Ability  Outcome: Ongoing, Progressing

## 2023-08-22 NOTE — OP NOTE
[facility] - 8th Floor Med Surg  Surgery Department  Operative Note    SUMMARY     Date of Procedure: 8/21/2023     Procedure: Procedure(s) (LRB):  GASTRECTOMY, SLEEVE, LAPAROSCOPIC (N/A)     Surgeon(s) and Role:     * Jerome Roth MD - Primary     * Paul Leavitt MD    Assisting Surgeon: None    Pre-Operative Diagnosis: Morbid obesity [E66.01]    Post-Operative Diagnosis: Post-Op Diagnosis Codes:     * Morbid obesity [E66.01]    Anesthesia: General    Operative Findings (including complications, if any):  Fatty liver    Description of Technical Procedures: Patient was taken to the operating room. After the induction of anesthesia the abdomen was prepped and draped in the usual sterile fashion. An optical trocar was used to access the peritoneum. Pneumoperitoneum was completed under direct vision. Additional working ports were placed under direct vision. A liver retractor was placed. The patient had no hiatal hernia defect.   The pylorus was identified and 4 cm proximal to the pylorus the greater curvature of the stomach was mobilized with Harmoinc scapel in the  direction of  the fundus and angle of His. The GE junction was identified the left dae was identified.  A 34F blunt-tip bougie was placed  in the stomach under direct vision towards the pylorus along the lesser curve Sequential firings of green and blue linear cutter stapler were used to create the antral pouch and the sleeve portion sleeve gastrectomy. Attention was paid to not encroach upon the angular incisura, or the GE junction. The staple line was intact and hemostatic. Fibrin glue product was used on the staple line the bougie was removed  the liver retractor was removed under direct vision specimen was removed fascia closed 0 Vicryl skin closed 4-0 Vicryl patient tolerated procedure well.      Estimated Blood Loss (EBL): * No values recorded between 8/21/2023 10:17 AM and 8/21/2023 11:08 AM *             Specimens:    Specimen (24h ago, onward)       Start     Ordered    08/21/23 1032  Specimen to Pathology  RELEASE UPON ORDERING        References:    Click here for ordering Quick Tip   Question:  Release to patient  Answer:  Immediate    08/21/23 1032                            Condition: Good    Disposition: PACU - hemodynamically stable.    Attestation: I was present and scrubbed for the entire procedure.

## 2023-08-22 NOTE — ANESTHESIA POSTPROCEDURE EVALUATION
Anesthesia Post Evaluation    Patient: Lynette Luna    Procedure(s) Performed: Procedure(s) (LRB):  GASTRECTOMY, SLEEVE, LAPAROSCOPIC (N/A)    Final Anesthesia Type: general      Patient location during evaluation: PACU  Patient participation: Yes- Able to Participate  Level of consciousness: awake and alert and oriented  Post-procedure vital signs: reviewed and stable  Pain management: adequate  Airway patency: patent  RIGOBERTO mitigation strategies: Verification of full reversal of neuromuscular block  PONV status at discharge: No PONV  Anesthetic complications: no      Cardiovascular status: blood pressure returned to baseline and stable  Respiratory status: spontaneous ventilation and unassisted  Hydration status: euvolemic  Follow-up not needed.  Comments: [facility]          Vitals Value Taken Time   /69 08/21/23 1330   Temp 36.4 °C (97.5 °F) 08/21/23 1113   Pulse 74 08/21/23 1338   Resp 15 08/21/23 1338   SpO2 95 % 08/21/23 1338   Vitals shown include unvalidated device data.      No case tracking events are documented in the log.      Pain/Marika Score: Pain Rating Prior to Med Admin: 0 (8/22/2023  9:19 AM)  Pain Rating Post Med Admin: 0 (8/22/2023  9:46 AM)  Marika Score: 10 (8/21/2023  5:33 PM)

## 2023-08-23 ENCOUNTER — PATIENT MESSAGE (OUTPATIENT)
Dept: ADMINISTRATIVE | Facility: OTHER | Age: 48
End: 2023-08-23
Payer: COMMERCIAL

## 2023-08-23 ENCOUNTER — PATIENT OUTREACH (OUTPATIENT)
Dept: ADMINISTRATIVE | Facility: CLINIC | Age: 48
End: 2023-08-23
Payer: COMMERCIAL

## 2023-08-23 LAB — PSYCHE PATHOLOGY RESULT: NORMAL

## 2023-08-23 NOTE — PROGRESS NOTES
C3 nurse spoke with Lynette Luna  for a TCC post hospital discharge follow up call. The patient has a scheduled HOSFU appointment with Marco Reyes MD  on 8/30/23 @ 2pm.

## 2023-08-25 ENCOUNTER — PATIENT MESSAGE (OUTPATIENT)
Dept: ADMINISTRATIVE | Facility: OTHER | Age: 48
End: 2023-08-25
Payer: COMMERCIAL

## 2023-08-27 ENCOUNTER — PATIENT MESSAGE (OUTPATIENT)
Dept: ADMINISTRATIVE | Facility: OTHER | Age: 48
End: 2023-08-27
Payer: COMMERCIAL

## 2023-08-28 ENCOUNTER — TELEPHONE (OUTPATIENT)
Dept: BARIATRICS | Facility: HOSPITAL | Age: 48
End: 2023-08-28
Payer: COMMERCIAL

## 2023-08-28 NOTE — TELEPHONE ENCOUNTER
Date call was completed: 08/21/23  Date and type of Surgery: 08/28/23 VSG    Are you drinking the recommended amount of water (73-100oz) a day? [x]  Yes        []  No  If not, how may ounces of water are you drinking?     Are you drinking the recommended amount of protein a day?(recommendations base on individual goal) [x]  Yes        []  No  If not, how many grams of protein are you drinking?    Are you taking the recommended supplements that were discussed in pre-op class?  [x]  Yes   [] No  Multivitamin [x]  Yes        []  No  Calcium Supplement [x]  Yes        []  No  Iron [x]  Yes        []  No  Miralax [x]  Yes        []  No    Are you walking at least 20 minutes a day for exercise? [x]  Yes   [] No    Have you resumed your home medications that you were instructed to resume? [x]  Yes   [] No    Do you have a follow-up appt scheduled with your family doctor or doctor treating you for you co-morb conditions within the week? [x]  Yes   [] No    Are you taking the Protonix (at night) that was prescribed to you in the hospital? [x]  Yes   [] No    Are you showering daily with an antibacterial soap?  [x]  Yes   [] No    Have you had a bowel movement since surgery? [x]  Yes   [] No

## 2023-08-30 ENCOUNTER — PATIENT MESSAGE (OUTPATIENT)
Dept: ADMINISTRATIVE | Facility: OTHER | Age: 48
End: 2023-08-30
Payer: COMMERCIAL

## 2023-09-05 ENCOUNTER — OFFICE VISIT (OUTPATIENT)
Dept: SURGERY | Facility: CLINIC | Age: 48
End: 2023-09-05
Payer: COMMERCIAL

## 2023-09-05 VITALS
BODY MASS INDEX: 51.43 KG/M2 | WEIGHT: 272.38 LBS | HEART RATE: 74 BPM | HEIGHT: 61 IN | SYSTOLIC BLOOD PRESSURE: 127 MMHG | DIASTOLIC BLOOD PRESSURE: 82 MMHG

## 2023-09-05 DIAGNOSIS — R63.5 WEIGHT GAIN, ABNORMAL: ICD-10-CM

## 2023-09-05 DIAGNOSIS — E61.1 IRON DEFICIENCY: ICD-10-CM

## 2023-09-05 DIAGNOSIS — R53.83 FATIGUE, UNSPECIFIED TYPE: ICD-10-CM

## 2023-09-05 DIAGNOSIS — Z48.89 ENCOUNTER FOR POSTOPERATIVE CARE: Primary | ICD-10-CM

## 2023-09-05 DIAGNOSIS — D51.1 MEGALOBLASTIC ANEMIA DUE TO VITAMIN B12 MALABSORPTION WITH PROTEINURIA: ICD-10-CM

## 2023-09-05 DIAGNOSIS — E51.9 VITAMIN B1 DEFICIENCY: ICD-10-CM

## 2023-09-05 PROCEDURE — 99024 PR POST-OP FOLLOW-UP VISIT: ICD-10-PCS | Mod: ,,, | Performed by: NURSE PRACTITIONER

## 2023-09-05 PROCEDURE — 3079F DIAST BP 80-89 MM HG: CPT | Mod: CPTII,,, | Performed by: NURSE PRACTITIONER

## 2023-09-05 PROCEDURE — 3074F PR MOST RECENT SYSTOLIC BLOOD PRESSURE < 130 MM HG: ICD-10-PCS | Mod: CPTII,,, | Performed by: NURSE PRACTITIONER

## 2023-09-05 PROCEDURE — 3074F SYST BP LT 130 MM HG: CPT | Mod: CPTII,,, | Performed by: NURSE PRACTITIONER

## 2023-09-05 PROCEDURE — 3079F PR MOST RECENT DIASTOLIC BLOOD PRESSURE 80-89 MM HG: ICD-10-PCS | Mod: CPTII,,, | Performed by: NURSE PRACTITIONER

## 2023-09-05 PROCEDURE — 4010F ACE/ARB THERAPY RXD/TAKEN: CPT | Mod: CPTII,,, | Performed by: NURSE PRACTITIONER

## 2023-09-05 PROCEDURE — 3008F PR BODY MASS INDEX (BMI) DOCUMENTED: ICD-10-PCS | Mod: CPTII,,, | Performed by: NURSE PRACTITIONER

## 2023-09-05 PROCEDURE — 4010F PR ACE/ARB THEARPY RXD/TAKEN: ICD-10-PCS | Mod: CPTII,,, | Performed by: NURSE PRACTITIONER

## 2023-09-05 PROCEDURE — 3008F BODY MASS INDEX DOCD: CPT | Mod: CPTII,,, | Performed by: NURSE PRACTITIONER

## 2023-09-05 PROCEDURE — 1159F MED LIST DOCD IN RCRD: CPT | Mod: CPTII,,, | Performed by: NURSE PRACTITIONER

## 2023-09-05 PROCEDURE — 1159F PR MEDICATION LIST DOCUMENTED IN MEDICAL RECORD: ICD-10-PCS | Mod: CPTII,,, | Performed by: NURSE PRACTITIONER

## 2023-09-05 PROCEDURE — 99024 POSTOP FOLLOW-UP VISIT: CPT | Mod: ,,, | Performed by: NURSE PRACTITIONER

## 2023-09-05 NOTE — PROGRESS NOTES
"[facility] Surgical - General Surgery Services  [address] [place] [id number]  Phone: [phone]  Fax: [phone]     Lynette Luna  Chief Complaint   Patient presents with    VSG     2 wk fu 8/21/23        History of Present Illness:  2 wk s/p lap gastric sleeve surgery on () by Dr. Jerome Roth. Presents today for 2 week follow up appt. No complaints with tolerating PO.   No dysphagia, odynophagia, GERD, NV, or abd pain. Regular BMs.     Diet: compliant with bariatric meal plan, tolerating liquids well.   Exercise: walking regularly  Vitamins: compliant with bariatric supplementation         Weights:   .[id number]         Estimated body mass index is 51.47 kg/m² as calculated from the following:    Height as of this encounter: 5' 1" (1.549 m).    Weight as of this encounter: 123.6 kg (272 lb 6.4 oz).          Review of Systems:      Current Outpatient Medications   Medication Sig Dispense Refill    amitriptyline (ELAVIL) 50 MG tablet Take 1 tablet (50 mg total) by mouth every evening. 90 tablet 3    losartan (COZAAR) 100 MG tablet Take 1 tablet (100 mg total) by mouth once daily. 90 tablet 3    metoprolol succinate (TOPROL-XL) 50 MG 24 hr tablet TAKE 1 TABLET BY MOUTH EVERY DAY 90 tablet 3    NURTEC 75 mg odt TAKE 1 TABLET (75 MG TOTAL) BY MOUTH ONCE AS NEEDED FOR MIGRAINE. PLACE ODT TABLET ON THE TONGUE ALTERNATIVELY THE ODT TABLET MAY BE PLACED UNDER THE TONGUE 8 tablet 3    pantoprazole (PROTONIX) 40 MG tablet Take 1 tablet (40 mg total) by mouth once daily. Take daily for 30 days post op 30 tablet 0    ondansetron (ZOFRAN-ODT) 4 MG TbDL Take 1 tablet (4 mg total) by mouth every 6 (six) hours as needed (first line med PRN NV). (Patient not taking: Reported on 9/5/2023) 20 tablet 0    promethazine (PHENERGAN) 12.5 MG Tab Take 1 tablet (12.5 mg total) by mouth every 6 (six) hours as needed (second line med prn NV). (Patient not taking: Reported on 9/5/2023) 20 tablet 0 "     No current facility-administered medications for this visit.     Facility-Administered Medications Ordered in Other Visits   Medication Dose Route Frequency Provider Last Rate Last Admin    cloNIDine 0.1 mg/24 hr td ptwk 1 patch  1 patch Transdermal Once PRN Nicole Gallardo, ANP        dextrose 10% bolus 125 mL 125 mL  12.5 g Intravenous PRN Sami, Nicole B, ANP        dextrose 10% bolus 250 mL 250 mL  25 g Intravenous PRN SamiRoddy eastmanen B, ANP        enoxaparin injection 40 mg  40 mg Subcutaneous Daily SamiNicole eastman B, ANP   40 mg at 08/22/23 0919    glucagon (human recombinant) injection 1 mg  1 mg Intramuscular PRN SamiRoddy eastmanen B, ANP        glucose chewable tablet 16 g  16 g Oral PRN Roddy Gallardoen B, ANP        glucose chewable tablet 24 g  24 g Oral PRN Sami, Nicole B, ANP        hydrALAZINE injection 10 mg  10 mg Intravenous Q6H PRN SamiRoddy eastmanen B, ANP   10 mg at 08/21/23 1130    insulin aspart U-100 injection 1-10 Units  1-10 Units Subcutaneous QID (AC + HS) PRN Nicole Gallardo B, ANP        labetaloL injection 10 mg  10 mg Intravenous Q2H PRN SamiRoddy eastmanen B, ANP   10 mg at 08/21/23 1153    lactated ringers infusion   Intravenous Continuous Nicole Gallardo B,  mL/hr at 08/21/23 1124 New Bag at 08/21/23 1124    ondansetron disintegrating tablet 4 mg  4 mg Oral Q6H PRN Roddy Gallardoen B, ANP        ondansetron injection 4 mg  4 mg Intravenous Q4H PRN Nicole Gallardo, ANP   4 mg at 08/21/23 1843    pantoprazole EC tablet 40 mg  40 mg Oral Daily SamiRoddy eastmanen B, ANP   40 mg at 08/22/23 0919    prochlorperazine injection Soln 5 mg  5 mg Intravenous Q6H PRN Nicole Gallardo B, ANP   5 mg at 08/21/23 1934    promethazine injection 12.5 mg  12.5 mg Intramuscular Q6H PRN SamiRoddy eastmanen B, ANP        traMADoL tablet 100 mg  100 mg Oral Q6H PRN SamiNicole eastman B, ANP           Physical:     Vital Signs (Most Recent)  Vitals:    09/05/23 1414   BP: 127/82   Pulse: 74       Physical Exam:  Physical  Exam  Abdominal:      General: Abdomen is flat. A surgical scar is present. There is no distension.     ASSESSMENT/PLAN:          Plan:  - F/U 6 weeks with bariatric labs  - Continue diet  - Exercise encouraged  - Continue vitamin supplementation  - Support group attendance encouraged  - Keep routine appts with PCP/specialists as scheduled

## 2023-09-06 ENCOUNTER — CLINICAL SUPPORT (OUTPATIENT)
Dept: BARIATRICS | Facility: HOSPITAL | Age: 48
End: 2023-09-06

## 2023-09-06 VITALS — BODY MASS INDEX: 51.02 KG/M2 | WEIGHT: 270 LBS

## 2023-09-06 DIAGNOSIS — Z98.84 HX OF BARIATRIC SURGERY: Primary | ICD-10-CM

## 2023-09-06 NOTE — PROGRESS NOTES
POST OP BARIATRIC NUTRITION FOLLOW UP    Type of surgery: sleeve gastrectomy   Post-op visit:   [x] 2 weeks  [] 4 weeks:  [] 2 months:  [] 4 months:  [] 6 months:  [] 9 months:  [] 1 year:   [] Other:     Weight: 270#        Post op complications:   [] Yes [x] No  If yes: [] Nausea   [] Vomiting   [] Constipation    [] Diarrhea    [] Other:     Dietary tolerance:  [x] Yes [] No [] Comment:     Adequate fluid intake:  [x] Yes [] No Approx. daily fluid intake:   Adequate protein intake:  [x] Yes [] No  Approx. daily protein intake:    Vitamins/Minerals:   [x] MVI:    [] Biotin:  [x] Calcium:    [] Hair/Nails:  [] B 50 complex:   [] Bariatric Specific MVI:  [] B12:    [] Bariatric Specific Calcium:  [x] Iron:    [] Other:   [] Non-compliance:        Labs:    NA  Comment:    Expected compliance:  [x]Good  []Fair  []Poor      Progress:   [x]Patient progressing well at this time with no complaints   [] Patient expressed complaint at this time: See additional notes       Bariatric Diet Education:   Verbalizes understanding Demonstrates  Needs further teaching Needs practice/ supervision Comments    Bariatric Clear [] [] [] []    Bariatric Full [] [] [] []    Bariatric Puree [] [] [] []    Bariatric Soft [] [] [] []    Bariatric Regular [] [] [] []    Bariatric Reintroduction of Starchy CHO [] [] [] []    Bariatric: Mindful Eating [] [] [] []    Importance of Protein and Vitamin Protocol [] [] [] []    Other:            Additional Notes:      Pt is doing well and understands diet progression

## 2023-09-22 ENCOUNTER — PATIENT MESSAGE (OUTPATIENT)
Dept: ADMINISTRATIVE | Facility: OTHER | Age: 48
End: 2023-09-22
Payer: COMMERCIAL

## 2023-10-03 ENCOUNTER — LAB VISIT (OUTPATIENT)
Dept: LAB | Facility: HOSPITAL | Age: 48
End: 2023-10-03
Attending: SURGERY
Payer: COMMERCIAL

## 2023-10-03 DIAGNOSIS — D51.1 MEGALOBLASTIC ANEMIA DUE TO VITAMIN B12 MALABSORPTION WITH PROTEINURIA: ICD-10-CM

## 2023-10-03 DIAGNOSIS — R63.5 WEIGHT GAIN, ABNORMAL: ICD-10-CM

## 2023-10-03 DIAGNOSIS — E61.1 IRON DEFICIENCY: ICD-10-CM

## 2023-10-03 DIAGNOSIS — R53.83 FATIGUE, UNSPECIFIED TYPE: ICD-10-CM

## 2023-10-03 LAB
ALBUMIN SERPL-MCNC: 3.8 G/DL (ref 3.5–5)
ALBUMIN/GLOB SERPL: 1.4 RATIO (ref 1.1–2)
ALP SERPL-CCNC: 104 UNIT/L (ref 40–150)
ALT SERPL-CCNC: 22 UNIT/L (ref 0–55)
AST SERPL-CCNC: 15 UNIT/L (ref 5–34)
BASOPHILS # BLD AUTO: 0.06 X10(3)/MCL
BASOPHILS NFR BLD AUTO: 1.3 %
BILIRUB SERPL-MCNC: 2.1 MG/DL
BUN SERPL-MCNC: 6.2 MG/DL (ref 7–18.7)
CALCIUM SERPL-MCNC: 9.3 MG/DL (ref 8.4–10.2)
CHLORIDE SERPL-SCNC: 110 MMOL/L (ref 98–107)
CO2 SERPL-SCNC: 25 MMOL/L (ref 22–29)
CREAT SERPL-MCNC: 0.71 MG/DL (ref 0.55–1.02)
EOSINOPHIL # BLD AUTO: 0.11 X10(3)/MCL (ref 0–0.9)
EOSINOPHIL NFR BLD AUTO: 2.3 %
ERYTHROCYTE [DISTWIDTH] IN BLOOD BY AUTOMATED COUNT: 13.8 % (ref 11.5–17)
EST. AVERAGE GLUCOSE BLD GHB EST-MCNC: 99.7 MG/DL
GFR SERPLBLD CREATININE-BSD FMLA CKD-EPI: >60 MLS/MIN/1.73/M2
GLOBULIN SER-MCNC: 2.7 GM/DL (ref 2.4–3.5)
GLUCOSE SERPL-MCNC: 96 MG/DL (ref 74–100)
HBA1C MFR BLD: 5.1 %
HCT VFR BLD AUTO: 40.5 % (ref 37–47)
HGB BLD-MCNC: 13.2 G/DL (ref 12–16)
IMM GRANULOCYTES # BLD AUTO: 0.01 X10(3)/MCL (ref 0–0.04)
IMM GRANULOCYTES NFR BLD AUTO: 0.2 %
IRON SATN MFR SERPL: 33 % (ref 20–50)
IRON SERPL-MCNC: 82 UG/DL (ref 50–170)
LYMPHOCYTES # BLD AUTO: 1.49 X10(3)/MCL (ref 0.6–4.6)
LYMPHOCYTES NFR BLD AUTO: 31.5 %
MCH RBC QN AUTO: 29.5 PG (ref 27–31)
MCHC RBC AUTO-ENTMCNC: 32.6 G/DL (ref 33–36)
MCV RBC AUTO: 90.6 FL (ref 80–94)
MONOCYTES # BLD AUTO: 0.35 X10(3)/MCL (ref 0.1–1.3)
MONOCYTES NFR BLD AUTO: 7.4 %
NEUTROPHILS # BLD AUTO: 2.71 X10(3)/MCL (ref 2.1–9.2)
NEUTROPHILS NFR BLD AUTO: 57.3 %
NRBC BLD AUTO-RTO: 0 %
PLATELET # BLD AUTO: 227 X10(3)/MCL (ref 130–400)
PMV BLD AUTO: 10.3 FL (ref 7.4–10.4)
POTASSIUM SERPL-SCNC: 4.1 MMOL/L (ref 3.5–5.1)
PROT SERPL-MCNC: 6.5 GM/DL (ref 6.4–8.3)
RBC # BLD AUTO: 4.47 X10(6)/MCL (ref 4.2–5.4)
SODIUM SERPL-SCNC: 143 MMOL/L (ref 136–145)
TIBC SERPL-MCNC: 165 UG/DL (ref 70–310)
TIBC SERPL-MCNC: 247 UG/DL (ref 250–450)
TRANSFERRIN SERPL-MCNC: 219 MG/DL (ref 180–382)
VIT B12 SERPL-MCNC: 354 PG/ML (ref 213–816)
WBC # SPEC AUTO: 4.73 X10(3)/MCL (ref 4.5–11.5)

## 2023-10-03 PROCEDURE — 85025 COMPLETE CBC W/AUTO DIFF WBC: CPT

## 2023-10-03 PROCEDURE — 36415 COLL VENOUS BLD VENIPUNCTURE: CPT

## 2023-10-03 PROCEDURE — 83550 IRON BINDING TEST: CPT

## 2023-10-03 PROCEDURE — 82607 VITAMIN B-12: CPT

## 2023-10-03 PROCEDURE — 80053 COMPREHEN METABOLIC PANEL: CPT

## 2023-10-03 PROCEDURE — 83036 HEMOGLOBIN GLYCOSYLATED A1C: CPT

## 2023-11-02 ENCOUNTER — CLINICAL SUPPORT (OUTPATIENT)
Dept: BARIATRICS | Facility: HOSPITAL | Age: 48
End: 2023-11-02

## 2023-11-02 ENCOUNTER — OFFICE VISIT (OUTPATIENT)
Dept: SURGERY | Facility: CLINIC | Age: 48
End: 2023-11-02
Payer: COMMERCIAL

## 2023-11-02 VITALS — BODY MASS INDEX: 49.73 KG/M2 | WEIGHT: 263.19 LBS

## 2023-11-02 VITALS
SYSTOLIC BLOOD PRESSURE: 147 MMHG | HEART RATE: 72 BPM | WEIGHT: 263.19 LBS | BODY MASS INDEX: 49.69 KG/M2 | DIASTOLIC BLOOD PRESSURE: 92 MMHG | HEIGHT: 61 IN

## 2023-11-02 DIAGNOSIS — Z13.0 SCREENING, ANEMIA, DEFICIENCY, IRON: ICD-10-CM

## 2023-11-02 DIAGNOSIS — E66.9 OBESITY: Primary | ICD-10-CM

## 2023-11-02 DIAGNOSIS — Z13.21 ENCOUNTER FOR VITAMIN DEFICIENCY SCREENING: Primary | ICD-10-CM

## 2023-11-02 DIAGNOSIS — Z98.84 HX OF BARIATRIC SURGERY: Primary | ICD-10-CM

## 2023-11-02 DIAGNOSIS — Z91.89 ELECTROLYTE IMBALANCE RISK: ICD-10-CM

## 2023-11-02 PROCEDURE — 3077F PR MOST RECENT SYSTOLIC BLOOD PRESSURE >= 140 MM HG: ICD-10-PCS | Mod: CPTII,,, | Performed by: SURGERY

## 2023-11-02 PROCEDURE — 3077F SYST BP >= 140 MM HG: CPT | Mod: CPTII,,, | Performed by: SURGERY

## 2023-11-02 PROCEDURE — 99024 POSTOP FOLLOW-UP VISIT: CPT | Mod: ,,, | Performed by: SURGERY

## 2023-11-02 PROCEDURE — 4010F PR ACE/ARB THEARPY RXD/TAKEN: ICD-10-PCS | Mod: CPTII,,, | Performed by: SURGERY

## 2023-11-02 PROCEDURE — 3080F DIAST BP >= 90 MM HG: CPT | Mod: CPTII,,, | Performed by: SURGERY

## 2023-11-02 PROCEDURE — 1159F MED LIST DOCD IN RCRD: CPT | Mod: CPTII,,, | Performed by: SURGERY

## 2023-11-02 PROCEDURE — 1159F PR MEDICATION LIST DOCUMENTED IN MEDICAL RECORD: ICD-10-PCS | Mod: CPTII,,, | Performed by: SURGERY

## 2023-11-02 PROCEDURE — 4010F ACE/ARB THERAPY RXD/TAKEN: CPT | Mod: CPTII,,, | Performed by: SURGERY

## 2023-11-02 PROCEDURE — 99024 PR POST-OP FOLLOW-UP VISIT: ICD-10-PCS | Mod: ,,, | Performed by: SURGERY

## 2023-11-02 PROCEDURE — 3044F PR MOST RECENT HEMOGLOBIN A1C LEVEL <7.0%: ICD-10-PCS | Mod: CPTII,,, | Performed by: SURGERY

## 2023-11-02 PROCEDURE — 3080F PR MOST RECENT DIASTOLIC BLOOD PRESSURE >= 90 MM HG: ICD-10-PCS | Mod: CPTII,,, | Performed by: SURGERY

## 2023-11-02 PROCEDURE — 3044F HG A1C LEVEL LT 7.0%: CPT | Mod: CPTII,,, | Performed by: SURGERY

## 2023-11-02 RX ORDER — QUINIDINE GLUCONATE 324 MG
480 TABLET, EXTENDED RELEASE ORAL 3 TIMES DAILY
COMMUNITY

## 2023-11-02 NOTE — PROGRESS NOTES
"POST OP BARIATRIC NUTRITION FOLLOW UP    Type of surgery: sleeve gastrectomy  Post-op visit:   [] 2 weeks  [] 4 weeks:  [x] 2 months:  [] 4 months:  [] 6 months:  [] 9 months:  [] 1 year:   [] Other:     Height:   Ht Readings from Last 1 Encounters:   09/05/23 5' 1" (1.549 m)      Weight:   Wt Readings from Last 3 Encounters:   11/02/23 0859 119.4 kg (263 lb 3.2 oz)   09/06/23 1110 122.5 kg (270 lb)   09/05/23 1414 123.6 kg (272 lb 6.4 oz)      BMI:   BMI Readings from Last 1 Encounters:   11/02/23 49.73 kg/m²            Post op complications:   [x] Yes [] No  If yes: [x] Nausea   [] Vomiting   [] Constipation    [] Diarrhea    [] Other:     Dietary tolerance:  [x] Yes [] No [] Comment:     Adequate fluid intake:  [x] Yes [] No Approx. daily fluid intake:   Adequate protein intake:  [x] Yes [] No  Approx. daily protein intake:    Vitamins/Minerals:   [x] MVI:    [] Biotin:  [x] Calcium:    [] Hair/Nails:  [] B 50 complex:   [] Bariatric Specific MVI:  [] B12:    [] Bariatric Specific Calcium:  [x] Iron:    [] Other:   [] Non-compliance:        Labs:    iron  247L   Comment:    Expected compliance:  [x]Good  []Fair  []Poor      Progress:   [x]Patient progressing well at this time with no complaints   [] Patient expressed complaint at this time: See additional notes       Bariatric Diet Education:   Verbalizes understanding Demonstrates  Needs further teaching Needs practice/ supervision Comments    Bariatric Clear [] [] [] []    Bariatric Full [] [] [] []    Bariatric Puree [] [] [] []    Bariatric Soft [] [] [] []    Bariatric Regular [] [] [] []    Bariatric Reintroduction of Starchy CHO [] [] [] []    Bariatric: Mindful Eating [] [] [] []    Importance of Protein and Vitamin Protocol [] [] [] []    Other:            Additional Notes:     pt wakes up nauseated, she will talk to  About that   Protein shake   Mashed potatoes   Yougurt   4oz  meat     "

## 2023-11-02 NOTE — PROGRESS NOTES
A 2 month F/U post op appointment was conducted with patient in the office. Her current weight is 263.2 lbs.A body composition and measurements were conducted.  Patient was educated on results. She lost 34 (54 lbs. From highest) and 19.5 inches. Patient states she does have cravings for crunchy foods. She is walking 30 minutes and biking 15 min. Qd. No issues or concerns.    PLAN:  - Patient will walk 30 minutes or more 3-5x/week and start strengthening exercises 2x/week.  - Patient will follow dietary advice from Marcie Pérez RD.    It is my professional opinion that patient is in the actuib stage of behavior change.    GERMAINE Burris, CPT, CHC

## 2023-11-02 NOTE — PROGRESS NOTES
"    [facility] Surgical - General Surgery Services  [address] [place] [id number]  Phone: [phone]  Fax: [phone]    [facility]  Post op Bariatric Note  Dr. Jerome Roth      Patient: Lynette Luna  Date: 11/02/2023   Author: Jerome Roth MD    Chief Complaint: No chief complaint on file.      History of Present Illness:  Ms. Lynette Luna is a 48 y.o. female who is 2 months s/p Lap Sleeve Gastrectomy o  Dr. Jerome Roth.   Pathology benign.   Presents today for routine 2 month follow up appt.   No complaints. No dysphagia, odynophagia, GERD, NV, or abd pain. Regular BMs.     Diet: compliant with bariatric meal plan, tolerating bariatric soft diet  Exercise: regular exercise, walking   Vitamins: compliant with bariatric supplementation per protocol  Labs: reviewed 2 month lab work     Height:     Weights:     Trend Weights BMI   Starting 317    Pre-Op     2 Week     2 Month 263 # 49   6 Month      1 Year     2 Year       Estimated body mass index is 49.73 kg/m² as calculated from the following:    Height as of this encounter: 5' 1" (1.549 m).    Weight as of this encounter: 119.4 kg (263 lb 3.2 oz).          Pertinent History:  HTN - [x] Yes   [] No    [] Resolved  HLD - [] Yes   [x] No    [] Resolved  DM  -  [] Yes   [x] No    [] Resolved  RIGOBERTO - [] Yes   [x] No   [] Resolved  GERD - [] Yes   [x] No [] Resolved  Anticoagulation- [] Yes   [x] No  If yes, type: [] ASA [] Plavix [] Other    Patient Care Team:  Marco Reyes MD as PCP - General (Family Medicine)  Jerome Roth MD as Surgeon (Bariatrics)     Review of Systems:    12 point review of systems conducted, negative except as stated in the history of present illness. See HPI for details.    Review of patient's allergies indicates:  No Known Allergies  Past Medical History:   Diagnosis Date    Gall bladder disease     Hematochezia     HTN (hypertension)     " Hyperglycemia     Insomnia     Migraine     Obesity     Other seasonal allergic rhinitis      Past Surgical History:   Procedure Laterality Date    ANKLE SURGERY Left 1992    CHOLECYSTECTOMY  2001    COLONOSCOPY  12/14/2021    Dr. Cook, repeat 10 years    ESOPHAGOGASTRODUODENOSCOPY N/A 07/19/2023    Procedure: EGD w BRUCE;  Surgeon: Jerome Roth MD;  Location: [facility] OR;  Service: General;  Laterality: N/A;    LAPAROSCOPIC SLEEVE GASTRECTOMY N/A 08/21/2023    Surgeon: Jerome Roth MD     Family History   Problem Relation Age of Onset    Cancer Mother     Hyperlipidemia Mother     Diabetes Mother     Hypertension Mother     Hypothyroidism Mother     Cancer Father     Hyperlipidemia Father     Diabetes Father     Hypertension Father     Diabetes Sister     Hypertension Sister     Diabetes Sister     Hypertension Sister     Cancer Maternal Grandmother     Diabetes Maternal Grandmother     Hypertension Maternal Grandmother     Hyperlipidemia Maternal Grandmother     Cancer Maternal Grandfather     Diabetes Maternal Grandfather     Hypertension Maternal Grandfather     Hyperlipidemia Maternal Grandfather     Cancer Paternal Grandmother     Diabetes Paternal Grandmother     Hypertension Paternal Grandmother     Hyperlipidemia Paternal Grandmother     Cancer Paternal Grandfather     Diabetes Paternal Grandfather     Hypertension Paternal Grandfather     Hyperlipidemia Paternal Grandfather      Social History     Tobacco Use    Smoking status: Never    Smokeless tobacco: Never   Substance Use Topics    Alcohol use: Not Currently     Comment: rarely    Drug use: Never      Current Outpatient Medications   Medication Instructions    amitriptyline (ELAVIL) 50 mg, Oral, Nightly    calcium citrate/vitamin D3 (CITRACAL + D ORAL) Oral    ferrous gluconate (FERGON) 480 mg, Oral, 3 times daily    losartan (COZAAR) 100 mg, Oral, Daily    metoprolol succinate (TOPROL-XL) 50 MG 24 hr tablet TAKE 1 TABLET BY MOUTH EVERY DAY  "   NURTEC 75 mg odt TAKE 1 TABLET (75 MG TOTAL) BY MOUTH ONCE AS NEEDED FOR MIGRAINE. PLACE ODT TABLET ON THE TONGUE ALTERNATIVELY THE ODT TABLET MAY BE PLACED UNDER THE TONGUE    pantoprazole (PROTONIX) 40 mg, Oral, Daily, Take daily for 30 days post op    pediatric multivitamin no.76 (FLINTSTONES COMPLETE ORAL) Oral       Objective:     Vital Signs (Most Recent):  Visit Vitals  BP (!) 147/92   Pulse 72   Ht 5' 1" (1.549 m)   Wt 119.4 kg (263 lb 3.2 oz)   BMI 49.73 kg/m²       Physical Exam:  General: Alert and oriented, No acute distress.  Respiratory: Clear to auscultation bilaterally; No wheezes, rales or rhonchi,Non-labored respirations  Cardiovascular: Regular rate and rhythm   Gastrointestinal: Abd soft, Non-tender, Non-distended, Bowel sounds present, Lap sites clean dry and intact, no evidence of infection.   Musculoskeletal: Normal range of motion.  Integumentary: Warm, Dry, Intact  Neurologic: No focal deficits  Psychiatric: Appropriate affect      Assessment and Plan:       ICD-10-CM ICD-9-CM   1. Encounter for vitamin deficiency screening  Z13.21 V77.99   2. Screening, anemia, deficiency, iron  Z13.0 V78.0   3. Electrolyte imbalance risk  Z91.89 V49.89       2 months post op s/p Laparoscopic Sleeve Gastrectomy.     - Follow up in 4 months with bariatric labs  - Continue diet per bariatric protocol  - Exercise encouraged  - Continue vitamin supplementation  - Support group attendance encouraged  - Keep routine appts with PCP/specialists as scheduled    Jerome Roth MD       "

## 2023-12-19 PROBLEM — Z23 IMMUNIZATION DUE: Status: ACTIVE | Noted: 2023-12-19

## 2023-12-19 PROBLEM — G44.229 CHRONIC TENSION-TYPE HEADACHE, NOT INTRACTABLE: Status: ACTIVE | Noted: 2022-11-28

## 2023-12-20 PROBLEM — Z98.84 H/O BARIATRIC SURGERY: Status: ACTIVE | Noted: 2023-12-20

## 2023-12-20 PROCEDURE — 87389 HIV-1 AG W/HIV-1&-2 AB AG IA: CPT | Performed by: FAMILY MEDICINE

## 2023-12-20 PROCEDURE — 86803 HEPATITIS C AB TEST: CPT | Performed by: FAMILY MEDICINE

## 2024-02-06 ENCOUNTER — PATIENT MESSAGE (OUTPATIENT)
Dept: SURGERY | Facility: CLINIC | Age: 49
End: 2024-02-06
Payer: COMMERCIAL

## 2024-02-21 NOTE — PROGRESS NOTES
"Initial Consult  Lynette Luna  Chief Complaint   Patient presents with    Follow-up     VSG 08/21/23- 6 month fu      History of Present Illness:  Ms. Lynette Luna is a 48 y.o. female who is 6 months s/p Lap Sleeve Gastrectomy with  Dr. Jerome Roth.   Pathology benign.   Presents today for routine 6 month follow up appt.   Pt reports nausea upon waking every other day. Tried warm water in the am, etc, but nothing has helped. denies dysphagia, odynophagia, GERD, vomiting, or abd pain. Regular Bms but occasional constipation.      Diet: compliant with bariatric meal plan but drinking two protein shakes a day and not eating enough actual food. Drinking a sip of coke every now and then, usually at home because her  drinks them.   Exercise: regular exercise, walking 1hr in the am and 1hr in the pm  Vitamins: compliant with bariatric supplementation per protocol    Labs (): not completed, will get today.   Labs: reviewed 2 month lab work      Height:   Weights:   Trend Weights BMI   Starting 317#     2 Month 263# 49   6 Month  246# 46    1 Year       2 Year          Estimated body mass index is 49.73 kg/m² as calculated from the following:    Height as of this encounter: 5' 1" (1.549 m).    Weight as of this encounter: 119.4 kg (263 lb 3.2 oz).                Pertinent History:  HTN - [x] Yes   [] No    [] Resolved  HLD - [] Yes   [x] No    [] Resolved  DM  -  [] Yes   [x] No    [] Resolved  RIGOBERTO - [] Yes   [x] No   [] Resolved  GERD - [] Yes   [x] No [] Resolved  Anticoagulation- [] Yes   [x] No      If yes, type: [] ASA [] Plavix [] Other    Patient Care Team:  Marco Reyes MD as PCP - General (Family Medicine)  Jerome Roth MD as Surgeon (Bariatrics)     Subjective:     12 point review of systems conducted, negative except as stated in the history of present illness. See HPI for details.    Review of patient's allergies indicates:  No Known Allergies  Past Medical History:   Diagnosis Date "    Gall bladder disease     Hematochezia     HTN (hypertension)     Hyperglycemia     Insomnia     Migraine     Obesity     Other seasonal allergic rhinitis      Past Surgical History:   Procedure Laterality Date    ANKLE SURGERY Left 1992    CHOLECYSTECTOMY  2001    COLONOSCOPY  12/14/2021    Dr. Cook, repeat 10 years    ESOPHAGOGASTRODUODENOSCOPY N/A 07/19/2023    Procedure: EGD w BRUCE;  Surgeon: Jerome Roth MD;  Location: [facility] OR;  Service: General;  Laterality: N/A;    FRACTURE SURGERY  1994    LAPAROSCOPIC SLEEVE GASTRECTOMY N/A 08/21/2023    Surgeon: Jerome Roth MD     Family History   Problem Relation Age of Onset    Hyperlipidemia Mother     Diabetes Mother     Hypertension Mother     Hypothyroidism Mother     Hyperlipidemia Father     Diabetes Father     Hypertension Father     Vision loss Father     Diabetes Sister     Hypertension Sister     Obesity Sister     Diabetes Sister     Hypertension Sister     Cancer Maternal Grandmother     Diabetes Maternal Grandmother     Hypertension Maternal Grandmother     Hyperlipidemia Maternal Grandmother     Cancer Maternal Grandfather     Diabetes Maternal Grandfather     Hypertension Maternal Grandfather     Hyperlipidemia Maternal Grandfather     Cancer Paternal Grandmother     Diabetes Paternal Grandmother     Hypertension Paternal Grandmother     Hyperlipidemia Paternal Grandmother     Cancer Paternal Grandfather     Diabetes Paternal Grandfather     Hypertension Paternal Grandfather     Hyperlipidemia Paternal Grandfather     Cancer Maternal Aunt      Social History     Tobacco Use    Smoking status: Never    Smokeless tobacco: Never   Substance Use Topics    Alcohol use: Not Currently     Comment: rarely    Drug use: Never      Current Outpatient Medications   Medication Instructions    amitriptyline (ELAVIL) 50 mg, Oral, Nightly    b complex vitamins tablet 1 tablet, Oral, Daily    calcium citrate/vitamin D3 (CITRACAL + D ORAL) Oral     "cyanocobalamin (VITAMIN B-12) 250 mcg, Oral, Daily    ferrous gluconate (FERGON) 480 mg, Oral, 3 times daily    losartan (COZAAR) 100 mg, Oral, Daily    metoprolol succinate (TOPROL-XL) 50 MG 24 hr tablet TAKE 1 TABLET BY MOUTH EVERY DAY    multivit-minerals/folic acid (CENTRUM ADULTS ORAL) Oral    NURTEC 75 mg odt TAKE 1 TABLET BY MOUTH ONCE AS NEEDED FOR MIGRAINE. PLACE ODT TABLET ON THE TONGUE ALTERNATIVELY THE ODT TABLET MAY BE PLACED UNDER THE TONGUE    vitamin D (VITAMIN D3) 1,000 Units, Oral, Daily       Objective:     Vital Signs (Most Recent):  Visit Vitals  /82   Pulse 78   Ht 5' 1" (1.549 m)   Wt 111.7 kg (246 lb 3.2 oz)   BMI 46.52 kg/m²       Physical Exam:  General:  Alert and oriented.    Respiratory:  Lungs are clear to auscultation, Respirations are non-labored, Breath sounds are equal.    Cardiovascular:  Normal rate, Regular rhythm, No murmur.    Gastrointestinal:  Soft, Non-tender, Non-distended, Normal bowel sounds    Musculoskeletal:  Normal range of motion, Normal strength.    Integumentary:  Warm, Dry, Pink.    Neurologic:  Alert, Oriented.    Psychiatric:  Cooperative.      Assessment:       ICD-10-CM ICD-9-CM   1. Encounter for vitamin deficiency screening  Z13.21 V77.99   2. Screening, anemia, deficiency, iron  Z13.0 V78.0   3. Electrolyte imbalance risk  Z91.89 V49.89   4. S/P bariatric surgery  Z98.84 V45.86   5. Obesity, unspecified classification, unspecified obesity type, unspecified whether serious comorbidity present  E66.9 278.00   6. Nausea  R11.0 787.02       Plan:     1. Encounter for vitamin deficiency screening  Vitamin B1, WB    Vitamin B12      2. Screening, anemia, deficiency, iron  Iron and TIBC    CBC Auto Differential      3. Electrolyte imbalance risk  Comprehensive Metabolic Panel      4. S/P bariatric surgery        5. Obesity, unspecified classification, unspecified obesity type, unspecified whether serious comorbidity present        6. Nausea                   "  - need to increase intensity of exercise.       - will send Rx of Protonix to take night, nausea possibly from silent gerd but could also be because she is just drinking a protein shake at night instead of eating.       - will review labs once in chart today.   - F/U 6 months with bariatric labs  - Continue diet  - Continue vitamin supplementation  - Support group attendance encouraged  - Keep routine appts with PCP/specialists as scheduled

## 2024-02-22 ENCOUNTER — OFFICE VISIT (OUTPATIENT)
Dept: SURGERY | Facility: CLINIC | Age: 49
End: 2024-02-22
Payer: COMMERCIAL

## 2024-02-22 ENCOUNTER — CLINICAL SUPPORT (OUTPATIENT)
Dept: BARIATRICS | Facility: HOSPITAL | Age: 49
End: 2024-02-22

## 2024-02-22 ENCOUNTER — LAB VISIT (OUTPATIENT)
Dept: LAB | Facility: HOSPITAL | Age: 49
End: 2024-02-22
Attending: SURGERY
Payer: COMMERCIAL

## 2024-02-22 VITALS
HEART RATE: 78 BPM | BODY MASS INDEX: 46.48 KG/M2 | WEIGHT: 246.19 LBS | HEIGHT: 61 IN | DIASTOLIC BLOOD PRESSURE: 82 MMHG | SYSTOLIC BLOOD PRESSURE: 133 MMHG

## 2024-02-22 VITALS — WEIGHT: 247.31 LBS | BODY MASS INDEX: 46.73 KG/M2

## 2024-02-22 DIAGNOSIS — Z91.89 ELECTROLYTE IMBALANCE RISK: ICD-10-CM

## 2024-02-22 DIAGNOSIS — Z98.84 HX OF BARIATRIC SURGERY: Primary | ICD-10-CM

## 2024-02-22 DIAGNOSIS — Z13.0 SCREENING, ANEMIA, DEFICIENCY, IRON: ICD-10-CM

## 2024-02-22 DIAGNOSIS — Z13.21 ENCOUNTER FOR VITAMIN DEFICIENCY SCREENING: Primary | ICD-10-CM

## 2024-02-22 DIAGNOSIS — R11.0 NAUSEA: ICD-10-CM

## 2024-02-22 DIAGNOSIS — E66.9 OBESITY, UNSPECIFIED CLASSIFICATION, UNSPECIFIED OBESITY TYPE, UNSPECIFIED WHETHER SERIOUS COMORBIDITY PRESENT: ICD-10-CM

## 2024-02-22 DIAGNOSIS — E66.9 OBESITY: Primary | ICD-10-CM

## 2024-02-22 DIAGNOSIS — Z98.84 S/P BARIATRIC SURGERY: ICD-10-CM

## 2024-02-22 DIAGNOSIS — Z13.21 ENCOUNTER FOR VITAMIN DEFICIENCY SCREENING: ICD-10-CM

## 2024-02-22 LAB
ALBUMIN SERPL-MCNC: 3.6 G/DL (ref 3.5–5)
ALBUMIN/GLOB SERPL: 1.2 RATIO (ref 1.1–2)
ALP SERPL-CCNC: 121 UNIT/L (ref 40–150)
ALT SERPL-CCNC: 15 UNIT/L (ref 0–55)
AST SERPL-CCNC: 15 UNIT/L (ref 5–34)
BASOPHILS # BLD AUTO: 0.09 X10(3)/MCL
BASOPHILS NFR BLD AUTO: 1.9 %
BILIRUB SERPL-MCNC: 1.2 MG/DL
BUN SERPL-MCNC: 10.5 MG/DL (ref 7–18.7)
CALCIUM SERPL-MCNC: 9.2 MG/DL (ref 8.4–10.2)
CHLORIDE SERPL-SCNC: 107 MMOL/L (ref 98–107)
CO2 SERPL-SCNC: 27 MMOL/L (ref 22–29)
CREAT SERPL-MCNC: 0.75 MG/DL (ref 0.55–1.02)
EOSINOPHIL # BLD AUTO: 0.23 X10(3)/MCL (ref 0–0.9)
EOSINOPHIL NFR BLD AUTO: 5 %
ERYTHROCYTE [DISTWIDTH] IN BLOOD BY AUTOMATED COUNT: 12.9 % (ref 11.5–17)
GFR SERPLBLD CREATININE-BSD FMLA CKD-EPI: >60 MLS/MIN/1.73/M2
GLOBULIN SER-MCNC: 3.1 GM/DL (ref 2.4–3.5)
GLUCOSE SERPL-MCNC: 74 MG/DL (ref 74–100)
HCT VFR BLD AUTO: 41.6 % (ref 37–47)
HGB BLD-MCNC: 13.1 G/DL (ref 12–16)
IMM GRANULOCYTES # BLD AUTO: 0 X10(3)/MCL (ref 0–0.04)
IMM GRANULOCYTES NFR BLD AUTO: 0 %
IRON SATN MFR SERPL: 34 % (ref 20–50)
IRON SERPL-MCNC: 90 UG/DL (ref 50–170)
LYMPHOCYTES # BLD AUTO: 1.13 X10(3)/MCL (ref 0.6–4.6)
LYMPHOCYTES NFR BLD AUTO: 24.4 %
MCH RBC QN AUTO: 29.4 PG (ref 27–31)
MCHC RBC AUTO-ENTMCNC: 31.5 G/DL (ref 33–36)
MCV RBC AUTO: 93.3 FL (ref 80–94)
MONOCYTES # BLD AUTO: 0.43 X10(3)/MCL (ref 0.1–1.3)
MONOCYTES NFR BLD AUTO: 9.3 %
NEUTROPHILS # BLD AUTO: 2.75 X10(3)/MCL (ref 2.1–9.2)
NEUTROPHILS NFR BLD AUTO: 59.4 %
NRBC BLD AUTO-RTO: 0 %
PLATELET # BLD AUTO: 239 X10(3)/MCL (ref 130–400)
PMV BLD AUTO: 10.4 FL (ref 7.4–10.4)
POTASSIUM SERPL-SCNC: 4 MMOL/L (ref 3.5–5.1)
PROT SERPL-MCNC: 6.7 GM/DL (ref 6.4–8.3)
RBC # BLD AUTO: 4.46 X10(6)/MCL (ref 4.2–5.4)
SODIUM SERPL-SCNC: 141 MMOL/L (ref 136–145)
TIBC SERPL-MCNC: 178 UG/DL (ref 70–310)
TIBC SERPL-MCNC: 268 UG/DL (ref 250–450)
TRANSFERRIN SERPL-MCNC: 248 MG/DL (ref 180–382)
VIT B12 SERPL-MCNC: 733 PG/ML (ref 213–816)
WBC # SPEC AUTO: 4.63 X10(3)/MCL (ref 4.5–11.5)

## 2024-02-22 PROCEDURE — 99213 OFFICE O/P EST LOW 20 MIN: CPT | Mod: ,,, | Performed by: NURSE PRACTITIONER

## 2024-02-22 PROCEDURE — 3008F BODY MASS INDEX DOCD: CPT | Mod: CPTII,,, | Performed by: NURSE PRACTITIONER

## 2024-02-22 PROCEDURE — 36415 COLL VENOUS BLD VENIPUNCTURE: CPT

## 2024-02-22 PROCEDURE — 1159F MED LIST DOCD IN RCRD: CPT | Mod: CPTII,,, | Performed by: NURSE PRACTITIONER

## 2024-02-22 PROCEDURE — 3075F SYST BP GE 130 - 139MM HG: CPT | Mod: CPTII,,, | Performed by: NURSE PRACTITIONER

## 2024-02-22 PROCEDURE — 3079F DIAST BP 80-89 MM HG: CPT | Mod: CPTII,,, | Performed by: NURSE PRACTITIONER

## 2024-02-22 RX ORDER — PANTOPRAZOLE SODIUM 40 MG/1
40 TABLET, DELAYED RELEASE ORAL
Qty: 90 TABLET | Refills: 3 | Status: SHIPPED | OUTPATIENT
Start: 2024-02-22

## 2024-02-22 RX ORDER — MULTIVIT WITH MINERALS/HERBS
1 TABLET ORAL DAILY
COMMUNITY

## 2024-02-22 NOTE — PROGRESS NOTES
A 6 month F/U was conducted with Gillian via phone. She reports a verbal weight of 247.3 lbs. A body composition was conducted and patient was educated on results. She has lost 49 lbs. Her goal is to be under 200 lbs. She is walking 7 days a week in am/pm for 1 hour outdoors. She admits that she is having cravings for carbonated beverages and has sipped when having cravings every now and then. Patient was educated on what carbonation can do to the pouch and the habit forming process. She understood.     It is my professional opinion that patient is in the action phase of behavior change.      Sharon Christianson, GERMAINE, CPT, CHC

## 2024-02-22 NOTE — PROGRESS NOTES
"POST OP BARIATRIC NUTRITION FOLLOW UP    Type of surgery: sleeve gastrectomy  Post-op visit:   [] 2 weeks  [] 4 weeks:  [] 2 months:  [] 4 months:  [x] 6 months:  [] 9 months:  [] 1 year:   [] Other:     Height:   Ht Readings from Last 1 Encounters:   12/20/23 5' 1" (1.549 m)      Weight:   Wt Readings from Last 3 Encounters:   02/22/24 0854 112.2 kg (247 lb 4.8 oz)   12/20/23 0742 116.6 kg (257 lb)   11/02/23 0930 119.4 kg (263 lb 3.2 oz)      BMI:   BMI Readings from Last 1 Encounters:   02/22/24 46.73 kg/m²            Post op complications:   [] Yes [] No  If yes: [] Nausea   [] Vomiting   [x] Constipation    [] Diarrhea    [] Other:  add in mirilax     Dietary tolerance:  [] Yes [] No [] Comment:     Adequate fluid intake:  [x] Yes [] No Approx. daily fluid intake:   Adequate protein intake:  [x] Yes [] No  Approx. daily protein intake: 90g    Vitamins/Minerals:   [x] MVI:    [] Biotin:  [x] Calcium:    [] Hair/Nails:  [x] B 50 complex:   [] Bariatric Specific MVI:  [x] B12:    [] Bariatric Specific Calcium:  [] Iron:    [] Other:   [] Non-compliance:        Labs:    not at this time   Comment:    Expected compliance:  [x]Good  []Fair  []Poor      Progress:   [x]Patient progressing well at this time with no complaints   [] Patient expressed complaint at this time: See additional notes       Bariatric Diet Education:   Verbalizes understanding Demonstrates  Needs further teaching Needs practice/ supervision Comments    Bariatric Clear [] [] [] []    Bariatric Full [] [] [] []    Bariatric Puree [] [] [] []    Bariatric Soft [] [] [] []    Bariatric Regular [] [] [] []    Bariatric Reintroduction of Starchy CHO [] [] [] []    Bariatric: Mindful Eating [] [] [] []    Importance of Protein and Vitamin Protocol [] [] [] []    Other:            Additional Notes:   Pt is doing well and understands diet progression, she has not done labs at this time   Protein shake   2 eggs   2oz meat and vegetable or starch   Protein " shake

## 2024-02-25 LAB — VIT B1 BLD-SCNC: 130 NMOL/L (ref 70–180)

## 2024-03-25 PROBLEM — Z00.00 ENCOUNTER FOR WELLNESS EXAMINATION IN ADULT: Status: RESOLVED | Noted: 2022-11-28 | Resolved: 2024-03-25

## 2024-06-17 ENCOUNTER — OFFICE VISIT (OUTPATIENT)
Dept: GYNECOLOGY | Facility: CLINIC | Age: 49
End: 2024-06-17
Payer: COMMERCIAL

## 2024-06-17 VITALS
RESPIRATION RATE: 20 BRPM | SYSTOLIC BLOOD PRESSURE: 109 MMHG | OXYGEN SATURATION: 100 % | BODY MASS INDEX: 47.65 KG/M2 | WEIGHT: 252.38 LBS | HEART RATE: 68 BPM | HEIGHT: 61 IN | DIASTOLIC BLOOD PRESSURE: 65 MMHG | TEMPERATURE: 99 F

## 2024-06-17 DIAGNOSIS — Z01.419 ENCOUNTER FOR ANNUAL ROUTINE GYNECOLOGICAL EXAMINATION: Primary | ICD-10-CM

## 2024-06-17 DIAGNOSIS — Z12.31 VISIT FOR SCREENING MAMMOGRAM: ICD-10-CM

## 2024-06-17 PROCEDURE — 1159F MED LIST DOCD IN RCRD: CPT | Mod: CPTII,,, | Performed by: NURSE PRACTITIONER

## 2024-06-17 PROCEDURE — 3078F DIAST BP <80 MM HG: CPT | Mod: CPTII,,, | Performed by: NURSE PRACTITIONER

## 2024-06-17 PROCEDURE — 99396 PREV VISIT EST AGE 40-64: CPT | Mod: S$PBB,,, | Performed by: NURSE PRACTITIONER

## 2024-06-17 PROCEDURE — 4010F ACE/ARB THERAPY RXD/TAKEN: CPT | Mod: CPTII,,, | Performed by: NURSE PRACTITIONER

## 2024-06-17 PROCEDURE — 3008F BODY MASS INDEX DOCD: CPT | Mod: CPTII,,, | Performed by: NURSE PRACTITIONER

## 2024-06-17 PROCEDURE — 3074F SYST BP LT 130 MM HG: CPT | Mod: CPTII,,, | Performed by: NURSE PRACTITIONER

## 2024-06-17 PROCEDURE — 99214 OFFICE O/P EST MOD 30 MIN: CPT | Mod: PBBFAC | Performed by: NURSE PRACTITIONER

## 2024-06-17 NOTE — PROGRESS NOTES
"  [facility] -  Gynecology / Women's Health Clinic      Subjective:       Patient ID: Lynette Luna is a 48 y.o. female.    Chief Complaint:  Gynecologic Exam    History of Present Illness  The patient  here for annual exam. Her LMP was 23. Period last 6 days and changes pads 2x/day. Denies history of abnormal paps. Last pap -NIL and HPV neg. MG-23 & BIRADS 1. Denies breast or urinary complaints. Denies pelvic pain, abnormal bleeding or discharge. Pt reports no STIs in the past and no concerns. Partner had vasectomy for contraception. Denies tobacco use. Dep. screening 0. Denies fly hx of breast, ovarian, uterine cancer. Colon cancer in [place]. Colonoscopy in . Hx of sleeve gastrectomy.    GYN & OB History  Patient's last menstrual period was 2024 (exact date).   Date of Last Pap: 2023    Review of patient's allergies indicates:  No Known Allergies  Past Medical History:   Diagnosis Date    Gall bladder disease     Hematochezia     HTN (hypertension)     Hyperglycemia     Insomnia     Migraine     Obesity     Other seasonal allergic rhinitis      OB History    Para Term  AB Living   5 4           SAB IAB Ectopic Multiple Live Births                  # Outcome Date GA Lbr Gab/2nd Weight Sex Type Anes PTL Lv   5             4 Para            3 Para            2 Para            1 Para                 Review of Systems  Review of Systems    Negative except for pertinent findings for positives per HPI     Objective:    Physical Exam    /65 (BP Location: Left arm, Patient Position: Sitting, BP Method: Large (Automatic))   Pulse 68   Temp 98.6 °F (37 °C) (Oral)   Resp 20   Ht 5' 1" (1.549 m)   Wt 114.5 kg (252 lb 6.4 oz)   LMP 2024 (Exact Date)   SpO2 100%   BMI 47.69 kg/m²   GENERAL: Well-developed female. No acute distress.    SKIN: Normal to inspection, warm and intact.  BREASTS: No rashes or erythema. No masses, lumps, " discharge, tenderness.  VULVA: General appearance normal; external genitalia with epidermal cyst Rt inner labia  VAGINA: Mucosa/vaginal vault pink, no abnormal discharge or lesions.  CERVIX: Pink, parous appearing os, no erythema or abnormal discharge.  BIMANUAL EXAM: Limited exam d/t body habitus. The uterus is non tender. Gerhard adnexa reveal no tenderness.  PSYCHIATRIC: Patient is oriented to person, place, and time. Mood and affect are normal.    Assessment:         ICD-10-CM ICD-9-CM   1. Encounter for annual routine gynecological examination  Z01.419 V72.31   2. Visit for screening mammogram  Z12.31 V76.12     Plan:   Lynette was seen today for gynecologic exam.    Diagnoses and all orders for this visit:    Encounter for annual routine gynecological examination    Visit for screening mammogram  -     Mammo Digital Screening Bilat w/ Yariel; Future    Pelvic today, pap utd per ACOG  MG ordered  Follow up in about 1 year (around 6/17/2025) for annual exam.

## 2024-06-27 ENCOUNTER — HOSPITAL ENCOUNTER (OUTPATIENT)
Dept: RADIOLOGY | Facility: HOSPITAL | Age: 49
Discharge: HOME | End: 2024-06-27
Attending: NURSE PRACTITIONER
Payer: COMMERCIAL

## 2024-06-27 DIAGNOSIS — Z12.31 VISIT FOR SCREENING MAMMOGRAM: ICD-10-CM

## 2024-06-27 PROCEDURE — 77067 SCR MAMMO BI INCL CAD: CPT | Mod: TC

## 2024-06-27 PROCEDURE — 77063 BREAST TOMOSYNTHESIS BI: CPT | Mod: 26,,, | Performed by: RADIOLOGY

## 2024-06-27 PROCEDURE — 77067 SCR MAMMO BI INCL CAD: CPT | Mod: 26,,, | Performed by: RADIOLOGY

## 2024-08-13 ENCOUNTER — TELEPHONE (OUTPATIENT)
Dept: SURGERY | Facility: CLINIC | Age: 49
End: 2024-08-13
Payer: COMMERCIAL

## 2024-08-13 DIAGNOSIS — Z13.21 ENCOUNTER FOR VITAMIN DEFICIENCY SCREENING: Primary | ICD-10-CM

## 2024-08-13 DIAGNOSIS — Z91.89 ELECTROLYTE IMBALANCE RISK: ICD-10-CM

## 2024-08-13 DIAGNOSIS — Z13.0 SCREENING, ANEMIA, DEFICIENCY, IRON: ICD-10-CM

## 2024-08-13 NOTE — TELEPHONE ENCOUNTER
Pt will go to [facility] prior to her appt to have labs completed   Orders are in her chart

## 2024-08-15 ENCOUNTER — LAB VISIT (OUTPATIENT)
Dept: LAB | Facility: HOSPITAL | Age: 49
End: 2024-08-15
Attending: SURGERY
Payer: COMMERCIAL

## 2024-08-15 DIAGNOSIS — Z13.0 SCREENING, ANEMIA, DEFICIENCY, IRON: ICD-10-CM

## 2024-08-15 DIAGNOSIS — Z13.21 ENCOUNTER FOR VITAMIN DEFICIENCY SCREENING: ICD-10-CM

## 2024-08-15 DIAGNOSIS — Z91.89 ELECTROLYTE IMBALANCE RISK: ICD-10-CM

## 2024-08-15 LAB
ALBUMIN SERPL-MCNC: 3.5 G/DL (ref 3.5–5)
ALBUMIN/GLOB SERPL: 1.2 RATIO (ref 1.1–2)
ALP SERPL-CCNC: 114 UNIT/L (ref 40–150)
ALT SERPL-CCNC: 15 UNIT/L (ref 0–55)
ANION GAP SERPL CALC-SCNC: 6 MEQ/L
AST SERPL-CCNC: 13 UNIT/L (ref 5–34)
BASOPHILS # BLD AUTO: 0.07 X10(3)/MCL
BASOPHILS NFR BLD AUTO: 1.5 %
BILIRUB SERPL-MCNC: 1.3 MG/DL
BUN SERPL-MCNC: 13.7 MG/DL (ref 7–18.7)
CALCIUM SERPL-MCNC: 9.2 MG/DL (ref 8.4–10.2)
CHLORIDE SERPL-SCNC: 112 MMOL/L (ref 98–107)
CO2 SERPL-SCNC: 25 MMOL/L (ref 22–29)
CREAT SERPL-MCNC: 0.81 MG/DL (ref 0.55–1.02)
CREAT/UREA NIT SERPL: 17
EOSINOPHIL # BLD AUTO: 0.14 X10(3)/MCL (ref 0–0.9)
EOSINOPHIL NFR BLD AUTO: 3 %
ERYTHROCYTE [DISTWIDTH] IN BLOOD BY AUTOMATED COUNT: 13 % (ref 11.5–17)
GFR SERPLBLD CREATININE-BSD FMLA CKD-EPI: >60 ML/MIN/1.73/M2
GLOBULIN SER-MCNC: 2.9 GM/DL (ref 2.4–3.5)
GLUCOSE SERPL-MCNC: 95 MG/DL (ref 74–100)
HCT VFR BLD AUTO: 39.4 % (ref 37–47)
HGB BLD-MCNC: 13 G/DL (ref 12–16)
IMM GRANULOCYTES # BLD AUTO: 0.01 X10(3)/MCL (ref 0–0.04)
IMM GRANULOCYTES NFR BLD AUTO: 0.2 %
IRON SATN MFR SERPL: 34 % (ref 20–50)
IRON SERPL-MCNC: 100 UG/DL (ref 50–170)
LYMPHOCYTES # BLD AUTO: 1.49 X10(3)/MCL (ref 0.6–4.6)
LYMPHOCYTES NFR BLD AUTO: 31.6 %
MCH RBC QN AUTO: 29.9 PG (ref 27–31)
MCHC RBC AUTO-ENTMCNC: 33 G/DL (ref 33–36)
MCV RBC AUTO: 90.6 FL (ref 80–94)
MONOCYTES # BLD AUTO: 0.37 X10(3)/MCL (ref 0.1–1.3)
MONOCYTES NFR BLD AUTO: 7.9 %
NEUTROPHILS # BLD AUTO: 2.63 X10(3)/MCL (ref 2.1–9.2)
NEUTROPHILS NFR BLD AUTO: 55.8 %
NRBC BLD AUTO-RTO: 0 %
PLATELET # BLD AUTO: 248 X10(3)/MCL (ref 130–400)
PLATELETS.RETICULATED NFR BLD AUTO: 2.7 % (ref 0.9–11.2)
PMV BLD AUTO: 10.2 FL (ref 7.4–10.4)
POTASSIUM SERPL-SCNC: 4.3 MMOL/L (ref 3.5–5.1)
PROT SERPL-MCNC: 6.4 GM/DL (ref 6.4–8.3)
RBC # BLD AUTO: 4.35 X10(6)/MCL (ref 4.2–5.4)
SODIUM SERPL-SCNC: 143 MMOL/L (ref 136–145)
TIBC SERPL-MCNC: 191 UG/DL (ref 70–310)
TIBC SERPL-MCNC: 291 UG/DL (ref 250–450)
TRANSFERRIN SERPL-MCNC: 260 MG/DL (ref 180–382)
VIT B12 SERPL-MCNC: 534 PG/ML (ref 213–816)
WBC # BLD AUTO: 4.71 X10(3)/MCL (ref 4.5–11.5)

## 2024-08-15 PROCEDURE — 36415 COLL VENOUS BLD VENIPUNCTURE: CPT

## 2024-08-20 LAB — VIT B1 BLD-SCNC: 143 NMOL/L (ref 70–180)

## 2024-08-22 ENCOUNTER — OFFICE VISIT (OUTPATIENT)
Dept: SURGERY | Facility: CLINIC | Age: 49
End: 2024-08-22
Payer: COMMERCIAL

## 2024-08-22 ENCOUNTER — CLINICAL SUPPORT (OUTPATIENT)
Dept: BARIATRICS | Facility: HOSPITAL | Age: 49
End: 2024-08-22

## 2024-08-22 VITALS
HEIGHT: 61 IN | HEART RATE: 67 BPM | SYSTOLIC BLOOD PRESSURE: 135 MMHG | BODY MASS INDEX: 47.2 KG/M2 | WEIGHT: 250 LBS | DIASTOLIC BLOOD PRESSURE: 89 MMHG

## 2024-08-22 VITALS — BODY MASS INDEX: 47.46 KG/M2 | WEIGHT: 251.19 LBS

## 2024-08-22 DIAGNOSIS — E66.9 OBESITY: Primary | ICD-10-CM

## 2024-08-22 DIAGNOSIS — R63.5 WEIGHT GAIN: ICD-10-CM

## 2024-08-22 DIAGNOSIS — Z71.3 ENCOUNTER FOR WEIGHT LOSS COUNSELING: ICD-10-CM

## 2024-08-22 DIAGNOSIS — Z98.84 HX OF BARIATRIC SURGERY: Primary | ICD-10-CM

## 2024-08-22 DIAGNOSIS — K59.00 CONSTIPATION, UNSPECIFIED CONSTIPATION TYPE: Primary | ICD-10-CM

## 2024-08-22 DIAGNOSIS — Z90.3 S/P GASTRIC SLEEVE PROCEDURE: ICD-10-CM

## 2024-08-22 DIAGNOSIS — R11.0 NAUSEA: ICD-10-CM

## 2024-08-22 DIAGNOSIS — E66.01 MORBID OBESITY: ICD-10-CM

## 2024-08-22 DIAGNOSIS — Z71.82 EXERCISE COUNSELING: ICD-10-CM

## 2024-08-22 DIAGNOSIS — Z71.3 DIETARY COUNSELING: ICD-10-CM

## 2024-08-22 PROCEDURE — 3079F DIAST BP 80-89 MM HG: CPT | Mod: CPTII,,, | Performed by: NURSE PRACTITIONER

## 2024-08-22 PROCEDURE — 4010F ACE/ARB THERAPY RXD/TAKEN: CPT | Mod: CPTII,,, | Performed by: NURSE PRACTITIONER

## 2024-08-22 PROCEDURE — 1159F MED LIST DOCD IN RCRD: CPT | Mod: CPTII,,, | Performed by: NURSE PRACTITIONER

## 2024-08-22 PROCEDURE — 1160F RVW MEDS BY RX/DR IN RCRD: CPT | Mod: CPTII,,, | Performed by: NURSE PRACTITIONER

## 2024-08-22 PROCEDURE — 99214 OFFICE O/P EST MOD 30 MIN: CPT | Mod: ,,, | Performed by: NURSE PRACTITIONER

## 2024-08-22 PROCEDURE — 3008F BODY MASS INDEX DOCD: CPT | Mod: CPTII,,, | Performed by: NURSE PRACTITIONER

## 2024-08-22 PROCEDURE — 3075F SYST BP GE 130 - 139MM HG: CPT | Mod: CPTII,,, | Performed by: NURSE PRACTITIONER

## 2024-08-22 RX ORDER — FAMOTIDINE 40 MG/1
40 TABLET, FILM COATED ORAL NIGHTLY PRN
Qty: 90 TABLET | Refills: 3 | Status: SHIPPED | OUTPATIENT
Start: 2024-08-22 | End: 2025-08-22

## 2024-08-22 RX ORDER — POLYETHYLENE GLYCOL 3350 17 G/17G
17 POWDER, FOR SOLUTION ORAL DAILY
Qty: 2 EACH | Refills: 3 | Status: SHIPPED | OUTPATIENT
Start: 2024-08-22

## 2024-08-22 NOTE — PROGRESS NOTES
A 1 year visit was conducted with Lynette in the office.  A body composition was conducted and patient lost 45 lbs. And 24.5 inches since preop body composition was conducted.  Patient was educated on her results. She would like to weigh under 200 lbs.  She is not exercising. She said she has no motivation.  No issues or concerns at this time.    It is my professional opinion that patient is in the contemplation stage of behavior change.      GERMAINE Burris, CPT, CHC

## 2024-08-22 NOTE — PROGRESS NOTES
"POST OP BARIATRIC NUTRITION FOLLOW UP    Type of surgery: sleeve gastrectomy  Post-op visit:   [] 2 weeks  [] 4 weeks:  [] 2 months:  [] 4 months:  [] 6 months:  [] 9 months:  [x] 1 year:   [] Other:     Height:   Ht Readings from Last 1 Encounters:   06/17/24 5' 1" (1.549 m)      Weight:   Wt Readings from Last 3 Encounters:   06/17/24 1050 114.5 kg (252 lb 6.4 oz)   02/22/24 0854 112.2 kg (247 lb 4.8 oz)   02/22/24 0921 111.7 kg (246 lb 3.2 oz)      BMI:   BMI Readings from Last 1 Encounters:   06/17/24 47.69 kg/m²     Post op complications:   [] Yes [x] No  If yes: [] Nausea   [] Vomiting   [] Constipation    [] Diarrhea    [] Other:     Dietary tolerance:  [x] Yes [] No [] Comment:     Adequate fluid intake:  [x] Yes [] No Approx. daily fluid intake: 160 oz  Adequate protein intake:  [x] Yes [] No  Approx. daily protein intake: 60 oz    Vitamins/Minerals:   [] MVI:    [] Biotin:  [x] Calcium:    [] Hair/Nails:  [] B 50 complex:   [x] Bariatric Specific MVI:  [] B12:    [] Bariatric Specific Calcium:  [] Iron:    [] Other:   [] Non-compliance:      Labs:   all lab ranges normal  Comment:    Expected compliance:  [x]Good  []Fair  []Poor    Progress:   [x]Patient progressing well at this time with no complaints   [] Patient expressed complaint at this time: See additional notes     Bariatric Diet Education:   Verbalizes understanding Demonstrates  Needs further teaching Needs practice/ supervision Comments    Bariatric Clear [] [] [] []    Bariatric Full [] [] [] []    Bariatric Puree [] [] [] []    Bariatric Soft [] [] [] []    Bariatric Regular [] [] [] []    Bariatric Reintroduction of Starchy CHO [x] [] [] []    Bariatric: Mindful Eating [x] [] [] []    Importance of Protein and Vitamin Protocol [x] [] [] []    Other:            Additional Notes:   Pt says has nausea first 2 minutes of waking up in morning- but after that it goes away. No v/d and pt claims she is tolerating foods well. She takes vitamins as " instructed and all lab ranges normal. She drinks around 100-160 oz water daily and achieves her rec'd protein goal daily. Pt said she thought she would have more energy at this point- rec'd her to follow RUBA Christianson exercise recs at her 1 year post op appt. To help with this.

## 2024-08-22 NOTE — PROGRESS NOTES
"Progress Note  Lynette Luna  Chief Complaint   Patient presents with    Follow-up     VSG 08/21/23 1 yr       History of Present Illness:  Ms. Lynette Luna is a 48 y.o. female who is 1 yr s/p Lap Sleeve Gastrectomy with  Dr. Jerome Roth.   Pathology benign.   Presents today for annual follow up appt.     +weight gain. Has incorporated tortillas and beans but usually only 3 times a week. No dedicated exercise. Asked her where she thinks the weight gain came from and pt responded "I am no moving"  Pt reports nausea upon waking every other day. Tried warm water in the am, etc, but nothing has helped. denies dysphagia, odynophagia, GERD, vomiting, or abd pain.     +constipation but not taking anything daily for it.      Diet: compliant with bariatric meal plan but drinking two protein shakes a day and not eating enough actual food. Drinking a sip of coke every now and then, usually at home because her  drinks them.   Exercise: no dedicated   Vitamins: compliant with bariatric supplementation per protocol. Zoltan complete and calcium.      Labs (8/15/24): WNLs  Labs: reviewed 2 month lab work      Height:   Weights:   Trend Weights BMI   Starting 317#     2 Month 263# 49   6 Month  246# 46    1 Year  250# 47   2 Year          Estimated body mass index is 49.73 kg/m² as calculated from the following:    Height as of this encounter: 5' 1" (1.549 m).    Weight as of this encounter: 119.4 kg (263 lb 3.2 oz).                Pertinent History:  HTN - [x] Yes   [] No    [] Resolved  HLD - [] Yes   [x] No    [] Resolved  DM  -  [] Yes   [x] No    [] Resolved  RIGOBERTO - [] Yes   [x] No   [] Resolved  GERD - [] Yes   [x] No [] Resolved  Anticoagulation- [] Yes   [x] No      If yes, type: [] ASA [] Plavix [] Other    Patient Care Team:  Marco Reyes MD as PCP - General (Family Medicine)  Jerome Roth MD as Surgeon (Bariatrics)     Subjective:     12 point review of systems conducted, negative except as " stated in the history of present illness. See HPI for details.    Review of patient's allergies indicates:  No Known Allergies  Past Medical History:   Diagnosis Date    Gall bladder disease     Hematochezia     HTN (hypertension)     Hyperglycemia     Insomnia     Migraine     Obesity     Other seasonal allergic rhinitis      Past Surgical History:   Procedure Laterality Date    ANKLE SURGERY Left 1992    CHOLECYSTECTOMY  2001    COLONOSCOPY  12/14/2021    Dr. Cook, repeat 10 years    ESOPHAGOGASTRODUODENOSCOPY N/A 07/19/2023    Procedure: EGD w BRUCE;  Surgeon: Jerome Roth MD;  Location: [facility] OR;  Service: General;  Laterality: N/A;    FRACTURE SURGERY  1994    LAPAROSCOPIC SLEEVE GASTRECTOMY N/A 08/21/2023    Surgeon: Jerome Roth MD     Family History   Problem Relation Name Age of Onset    Hyperlipidemia Mother Katherine     Diabetes Mother Katherine     Hypertension Mother Katherine     Hypothyroidism Mother Katherine     Hyperlipidemia Father Terry     Diabetes Father Terry     Hypertension Father Terry     Vision loss Father Terry     Diabetes Sister Luci     Hypertension Sister Luci     Obesity Sister Luci     Diabetes Sister Silvia     Hypertension Sister Silvia     Cancer Maternal Grandmother None     Diabetes Maternal Grandmother None     Hypertension Maternal Grandmother None     Hyperlipidemia Maternal Grandmother None     Cancer Maternal Grandfather None     Diabetes Maternal Grandfather None     Hypertension Maternal Grandfather None     Hyperlipidemia Maternal Grandfather None     Cancer Paternal Grandmother None     Diabetes Paternal Grandmother None     Hypertension Paternal Grandmother None     Hyperlipidemia Paternal Grandmother None     Cancer Paternal Grandfather None     Diabetes Paternal Grandfather None     Hypertension Paternal Grandfather None     Hyperlipidemia Paternal Grandfather None     Cancer Maternal Aunt Nadia      Social History     Tobacco Use    Smoking status: Never     "Smokeless tobacco: Never   Substance Use Topics    Alcohol use: Not Currently     Comment: rarely    Drug use: Never      Current Outpatient Medications   Medication Instructions    amitriptyline (ELAVIL) 50 mg, Oral, Nightly    b complex vitamins tablet 1 tablet, Oral, Daily    calcium citrate/vitamin D3 (CITRACAL + D ORAL) Oral    cyanocobalamin (VITAMIN B-12) 250 mcg, Oral, Daily    famotidine (PEPCID) 40 mg, Oral, Nightly PRN    losartan (COZAAR) 100 mg, Oral    metoprolol succinate (TOPROL-XL) 50 MG 24 hr tablet TAKE 1 TABLET BY MOUTH EVERY DAY    multivit-minerals/folic acid (CENTRUM ADULTS ORAL) Oral    NURTEC 75 mg odt TAKE 1 TABLET ON THE TONGUE ONCE AS NEEDED FOR MIGRANIE.    polyethylene glycol (GLYCOLAX) 17 g, Oral, Daily       Objective:     Vital Signs (Most Recent):  Visit Vitals  /89   Pulse 67   Ht 5' 1" (1.549 m)   Wt 113.4 kg (250 lb)   LMP 07/21/2024 (Approximate)   BMI 47.24 kg/m²       Physical Exam:  General:  Alert and oriented.    Respiratory:  Lungs are clear to auscultation, Respirations are non-labored, Breath sounds are equal.    Cardiovascular:  Normal rate, Regular rhythm, No murmur.    Gastrointestinal:  Soft, Non-tender, Non-distended, Normal bowel sounds        Musculoskeletal:  Normal range of motion, Normal strength.    Integumentary:  Warm, Dry, Pink.    Neurologic:  Alert, Oriented.    Psychiatric:  Cooperative.      Assessment:       ICD-10-CM ICD-9-CM   1. Constipation, unspecified constipation type  K59.00 564.00   2. Nausea  R11.0 787.02   3. Encounter for weight loss counseling  Z71.3 V65.3   4. Exercise counseling  Z71.82 V65.41   5. Dietary counseling  Z71.3 V65.3   6. S/P gastric sleeve procedure  Z90.3 V45.75   7. Morbid obesity  E66.01 278.01   8. Weight gain  R63.5 783.1       Plan:     1. Constipation, unspecified constipation type        2. Nausea        3. Encounter for weight loss counseling        4. Exercise counseling        5. Dietary counseling      "   6. S/P gastric sleeve procedure        7. Morbid obesity        8. Weight gain          - take milk of magnesia for constipation and then miralax daily to help with soft normal Bms.   - send Rx of pepcid to take at night to see if this will help with morning time nausea  - 6mth f/u for accountability  - encouraged pt that she needs to meal prep again and weigh food, be prepared for the day. Consistency with meals.   - goal for next time. Go back to stage 5 in diet, which is low carbohydrate stage. follow for three months. Exercise 30min a day for three days a week and then increase to five days a week.   - Discussed common downfalls of patients that gain weight after their first year such as adding in carbs too soon, popcorn or other snack type foods rather than eating protein forward meals, increasing nut intake, or not measuring amount when eating nuts since this can be very high in calories, as well as falling back into previous bad habits. Pt verbalized understanding of some of the issues her is doing wrong and will try and correct these.   - Discussed possible surgical risks with patient that can occur at any point in time such as but not limited to vitamin and mineral deficiency, ulceration from smoking/NSAIDs/Anti-inflammatory medications, gallbladder disfunction, etc. If patient has any severe abd pain that is constant, nausea, vomiting, disruption of bowel movements, or has other concerns they are instructed to call the office or present to the emergency room. Pt verbalized understanding   - F/U 1 year with bariatric labs (annually)  - Continue exercising and following bariatric vitamin supplementation  - Support group attendance encouraged   - Keep routine appts with PCP/specialists as scheduled

## 2024-12-26 PROBLEM — K21.9 GASTROESOPHAGEAL REFLUX DISEASE: Status: ACTIVE | Noted: 2024-12-26

## 2025-06-19 ENCOUNTER — OFFICE VISIT (OUTPATIENT)
Dept: GYNECOLOGY | Facility: CLINIC | Age: 50
End: 2025-06-19
Payer: COMMERCIAL

## 2025-06-19 VITALS
TEMPERATURE: 99 F | RESPIRATION RATE: 20 BRPM | DIASTOLIC BLOOD PRESSURE: 80 MMHG | HEIGHT: 61 IN | SYSTOLIC BLOOD PRESSURE: 142 MMHG | HEART RATE: 87 BPM | WEIGHT: 266.19 LBS | OXYGEN SATURATION: 100 % | BODY MASS INDEX: 50.26 KG/M2

## 2025-06-19 DIAGNOSIS — Z12.31 VISIT FOR SCREENING MAMMOGRAM: ICD-10-CM

## 2025-06-19 DIAGNOSIS — Z01.419 ENCOUNTER FOR ANNUAL ROUTINE GYNECOLOGICAL EXAMINATION: Primary | ICD-10-CM

## 2025-06-19 PROCEDURE — 3077F SYST BP >= 140 MM HG: CPT | Mod: CPTII,,, | Performed by: NURSE PRACTITIONER

## 2025-06-19 PROCEDURE — 3079F DIAST BP 80-89 MM HG: CPT | Mod: CPTII,,, | Performed by: NURSE PRACTITIONER

## 2025-06-19 PROCEDURE — 99396 PREV VISIT EST AGE 40-64: CPT | Mod: S$PBB,,, | Performed by: NURSE PRACTITIONER

## 2025-06-19 PROCEDURE — 4010F ACE/ARB THERAPY RXD/TAKEN: CPT | Mod: CPTII,,, | Performed by: NURSE PRACTITIONER

## 2025-06-19 PROCEDURE — 1159F MED LIST DOCD IN RCRD: CPT | Mod: CPTII,,, | Performed by: NURSE PRACTITIONER

## 2025-06-19 PROCEDURE — 99214 OFFICE O/P EST MOD 30 MIN: CPT | Mod: PBBFAC | Performed by: NURSE PRACTITIONER

## 2025-06-19 PROCEDURE — 3008F BODY MASS INDEX DOCD: CPT | Mod: CPTII,,, | Performed by: NURSE PRACTITIONER

## 2025-06-19 NOTE — PROGRESS NOTES
"  [facility] -  Gynecology / Women's Health Clinic      Subjective:       Patient ID: Lynette Luna is a 49 y.o. female.    Chief Complaint:  Gynecologic Exam    History of Present Illness  The patient  here for annual exam. Her LMP was 6/10/25. Period last 6 days and changes pads 2x/day. Did miss 3 cycles before . Denies history of abnormal paps. Last pap -NIL and HPV neg. MG-24 & BIRADS 1. Denies breast or urinary complaints. Denies pelvic pain, abnormal bleeding or discharge. Pt reports no STIs in the past and no concerns. Partner had vasectomy for contraception. Denies tobacco use. Dep. screening 0. Denies fly hx of breast, ovarian, uterine cancer. Colon cancer in [place]. Colonoscopy in , repeat in 10 years. Hx of sleeve gastrectomy.    GYN & OB History  Patient's last menstrual period was 06/10/2025 (exact date).   Date of Last Pap: 2023    Review of patient's allergies indicates:  No Known Allergies  Past Medical History:   Diagnosis Date    Gall bladder disease     Hematochezia     HTN (hypertension)     Hyperglycemia     Insomnia     Migraine     Obesity     Other seasonal allergic rhinitis      OB History    Para Term  AB Living   5 4       SAB IAB Ectopic Multiple Live Births             # Outcome Date GA Lbr Gab/2nd Weight Sex Type Anes PTL Lv   5             4 Para            3 Para            2 Para            1 Para                 Review of Systems  Review of Systems    Negative except for pertinent findings for positives per HPI     Objective:    Physical Exam    BP (!) 142/80 (BP Location: Left arm, Patient Position: Sitting)   Pulse 87   Temp 98.6 °F (37 °C) (Oral)   Resp 20   Ht 5' 1" (1.549 m)   Wt 120.7 kg (266 lb 3.2 oz)   LMP 06/10/2025 (Exact Date)   SpO2 100%   BMI 50.30 kg/m²   GENERAL: Well-developed female. No acute distress.    SKIN: Normal to inspection, warm and intact.  BREASTS: No rashes or erythema. No masses, " lumps, discharge, tenderness.  VULVA: General appearance normal; external genitalia with epidermal cyst Rt inner labia  VAGINA: Mucosa/vaginal vault pink, no abnormal discharge or lesions.  CERVIX: Pink, parous appearing os, no erythema or abnormal discharge.  BIMANUAL EXAM: Limited exam d/t body habitus. The uterus is non tender. Gerhard adnexa reveal no tenderness.  PSYCHIATRIC: Patient is oriented to person, place, and time. Mood and affect are normal.    Assessment:         ICD-10-CM ICD-9-CM   1. Encounter for annual routine gynecological examination  Z01.419 V72.31   2. Visit for screening mammogram  Z12.31 V76.12     Plan:   Lynette was seen today for gynecologic exam.    Diagnoses and all orders for this visit:    Encounter for annual routine gynecological examination    Visit for screening mammogram  -     Mammo Digital Screening Bilat w/ Yariel (XPD); Future    Pelvic today, pap utd per ACOG  MG ordered  Follow up in about 1 year (around 6/19/2026) for annual exam.

## 2025-07-01 ENCOUNTER — HOSPITAL ENCOUNTER (OUTPATIENT)
Dept: RADIOLOGY | Facility: HOSPITAL | Age: 50
Discharge: HOME | End: 2025-07-01
Attending: NURSE PRACTITIONER
Payer: COMMERCIAL

## 2025-07-01 DIAGNOSIS — Z12.31 VISIT FOR SCREENING MAMMOGRAM: ICD-10-CM

## 2025-07-01 PROCEDURE — 77067 SCR MAMMO BI INCL CAD: CPT | Mod: 26,,, | Performed by: STUDENT IN AN ORGANIZED HEALTH CARE EDUCATION/TRAINING PROGRAM

## 2025-07-01 PROCEDURE — 77067 SCR MAMMO BI INCL CAD: CPT | Mod: TC

## 2025-07-01 PROCEDURE — 77063 BREAST TOMOSYNTHESIS BI: CPT | Mod: 26,,, | Performed by: STUDENT IN AN ORGANIZED HEALTH CARE EDUCATION/TRAINING PROGRAM

## 2025-07-08 ENCOUNTER — TELEPHONE (OUTPATIENT)
Dept: SURGERY | Facility: CLINIC | Age: 50
End: 2025-07-08

## (undated) DEVICE — TROCAR ENDOPATH XCEL 15MM 10CM

## (undated) DEVICE — BINDER ABD 12IN LG 63-74IN

## (undated) DEVICE — IRRIGATOR HYDRO-SURG PLUS 5MM

## (undated) DEVICE — CUSHION  WC FOAM 20X20X.75IN

## (undated) DEVICE — KIT GEN LAPAROSCOPY LAFAYETTE

## (undated) DEVICE — CHLORAPREP W TINT 26ML APPL

## (undated) DEVICE — SUT VICRYL+ 27 UR-6 VIOL

## (undated) DEVICE — NDL GRANEE OPEN LOOP GRASPER

## (undated) DEVICE — MATTRESS HOVERMAT SPLIT TRNSF

## (undated) DEVICE — GLOVE PROTEXIS LTX MICRO  7.5

## (undated) DEVICE — SUT VICRYL PLUS 4-0 FS-2 27IN

## (undated) DEVICE — ELECTRODE PATIENT RETURN DISP

## (undated) DEVICE — SYR ONLY LUER LOCK 20CC

## (undated) DEVICE — HOLDER STRIP-T SELF ADH 2X10IN

## (undated) DEVICE — POSITIONER HEEL FOAM CONVOLTD

## (undated) DEVICE — CLOSURE SKIN STERI STRIP 1/2X4

## (undated) DEVICE — SHEARS HS LONG 5MM CURVED

## (undated) DEVICE — COVER MAYO STAND REINFRCD 30

## (undated) DEVICE — RELOAD ECHELON FLEX BLU 60MM

## (undated) DEVICE — DRAPE SLUSH WARMER 66X44IN

## (undated) DEVICE — PAD PREP 50/CA

## (undated) DEVICE — CLIP ENDO LIGATION LARGE CLIPS

## (undated) DEVICE — SOL IRRI STRL WATER 1000ML

## (undated) DEVICE — APPLICATOR CHLORAPREP ORN 26ML

## (undated) DEVICE — STAPLER ECHELON FLEX 60MM 44CM

## (undated) DEVICE — TROCAR ENDOPATH XCEL 11MM 10CM

## (undated) DEVICE — SCISSOR CURVED ENDOPATH 5MM

## (undated) DEVICE — STAPLER ECHELON LONG 60X440MM

## (undated) DEVICE — KIT SURGICAL TURNOVER

## (undated) DEVICE — GLOVE PROTEXIS HYDROGEL SZ7.5

## (undated) DEVICE — APPLICATOR VISTASEAL FLEX 45CM

## (undated) DEVICE — APPLICATOR VISTASEAL RIG 35CM

## (undated) DEVICE — SEALANT VISTASEAL FIBRIN 10ML

## (undated) DEVICE — TROCAR ENDOPATH XCEL 5X100MM

## (undated) DEVICE — NDL SAF 21GX1-1/2IN HYPO BVL

## (undated) DEVICE — TROCAR LAPSCP XCEL 12MM 10CM

## (undated) DEVICE — RELOAD ECHELON FLEX GRN 60MM

## (undated) DEVICE — CANNULA ENDOPATH XCEL 5X100MM